# Patient Record
Sex: FEMALE | Race: WHITE | NOT HISPANIC OR LATINO | Employment: OTHER | RURAL
[De-identification: names, ages, dates, MRNs, and addresses within clinical notes are randomized per-mention and may not be internally consistent; named-entity substitution may affect disease eponyms.]

---

## 2022-05-18 ENCOUNTER — OFFICE VISIT (OUTPATIENT)
Dept: FAMILY MEDICINE CLINIC | Facility: CLINIC | Age: 72
End: 2022-05-18

## 2022-05-18 VITALS
WEIGHT: 149.5 LBS | HEIGHT: 67 IN | SYSTOLIC BLOOD PRESSURE: 138 MMHG | DIASTOLIC BLOOD PRESSURE: 88 MMHG | OXYGEN SATURATION: 99 % | BODY MASS INDEX: 23.46 KG/M2 | HEART RATE: 80 BPM

## 2022-05-18 DIAGNOSIS — Z90.710 H/O TOTAL HYSTERECTOMY: ICD-10-CM

## 2022-05-18 DIAGNOSIS — Z71.89 ADVANCED CARE PLANNING/COUNSELING DISCUSSION: ICD-10-CM

## 2022-05-18 DIAGNOSIS — Z13.820 SCREENING FOR OSTEOPOROSIS: ICD-10-CM

## 2022-05-18 DIAGNOSIS — M25.551 RIGHT HIP PAIN: ICD-10-CM

## 2022-05-18 DIAGNOSIS — I10 BENIGN ESSENTIAL HTN: ICD-10-CM

## 2022-05-18 DIAGNOSIS — Z93.2 ILEOSTOMY IN PLACE: ICD-10-CM

## 2022-05-18 DIAGNOSIS — Z00.00 INITIAL MEDICARE ANNUAL WELLNESS VISIT: Primary | ICD-10-CM

## 2022-05-18 DIAGNOSIS — N95.9 UNSPECIFIED MENOPAUSAL AND PERIMENOPAUSAL DISORDER: ICD-10-CM

## 2022-05-18 DIAGNOSIS — Z90.49 H/O COLECTOMY: ICD-10-CM

## 2022-05-18 DIAGNOSIS — E56.9 VITAMIN DEFICIENCY: ICD-10-CM

## 2022-05-18 DIAGNOSIS — E78.2 MIXED HYPERLIPIDEMIA: ICD-10-CM

## 2022-05-18 DIAGNOSIS — Z79.890 HORMONE REPLACEMENT THERAPY: ICD-10-CM

## 2022-05-18 DIAGNOSIS — Z12.31 ENCOUNTER FOR SCREENING MAMMOGRAM FOR MALIGNANT NEOPLASM OF BREAST: ICD-10-CM

## 2022-05-18 DIAGNOSIS — Z12.11 SCREENING FOR COLON CANCER: ICD-10-CM

## 2022-05-18 PROBLEM — Z12.39 SCREENING FOR BREAST CANCER: Status: ACTIVE | Noted: 2022-05-18

## 2022-05-18 PROCEDURE — 1170F FXNL STATUS ASSESSED: CPT | Performed by: NURSE PRACTITIONER

## 2022-05-18 PROCEDURE — 96160 PT-FOCUSED HLTH RISK ASSMT: CPT | Performed by: NURSE PRACTITIONER

## 2022-05-18 PROCEDURE — G0438 PPPS, INITIAL VISIT: HCPCS | Performed by: NURSE PRACTITIONER

## 2022-05-18 PROCEDURE — 99213 OFFICE O/P EST LOW 20 MIN: CPT | Performed by: NURSE PRACTITIONER

## 2022-05-18 PROCEDURE — 1159F MED LIST DOCD IN RCRD: CPT | Performed by: NURSE PRACTITIONER

## 2022-05-18 RX ORDER — ESTRADIOL 1 MG/1
TABLET ORAL
COMMUNITY
Start: 2022-05-17

## 2022-05-18 NOTE — ASSESSMENT & PLAN NOTE
She denies Pap smears stating total hysterectomy.  She understands the risks of hormone replacement therapy.

## 2022-05-18 NOTE — ASSESSMENT & PLAN NOTE
Had lab work completed earlier in the year and denies having repeat blood work today.  We will schedule mammogram.  We will schedule DEXA scan.  We will schedule with GI.  We discussed all immunizations and patient denies all immunizations.  Education provided.  Does not need help with ADLs.  No cognitive impairment.  No hearing impairment.  Overall patient is doing well.  We discussed advanced directives and she states she and her  have information at home on that as she denied further information.  Return to clinic or ED with any issues or concerns.

## 2022-05-18 NOTE — PROGRESS NOTES
The ABCs of the Annual Wellness Visit  Initial Medicare Wellness Visit    Chief Complaint   Patient presents with   • Breast Problem     Subjective   History of Present Illness:  Gloria Bruner is a 72 y.o. female who presents for an Initial Medicare Wellness Visit.    The following portions of the patient's history were reviewed and   updated as appropriate: allergies, current medications, past family history, past medical history, past social history, past surgical history and problem list.     Compared to one year ago, the patient feels her physical   health is the same.    Compared to one year ago, the patient feels her mental   health is the same.    Patient states last week she was standing on the bed and fell off and landed on her right hip.  She is bruised on her right lateral hip.  She is still sore to touch.  No open skin.  Full range of motion of the hip.    States that total hysterectomy 48 years ago and is since then been on estrogen replacement.  She understands the risk of this medication.  Last mammogram was 2013 so she is due a repeat we will get that scheduled.  States no breast issues it is just been a while since she had a mammogram.    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    She denies immunizations such as pneumonia shingles COVID and understands risk of not doing them.  Needs a new referral to GI as she states her past gastroenterologist retired and she has not seen them in over a year.  Last DEXA scan was 2013 so was requesting to have one scheduled.    Doing well today with no major concerns.  No dizziness no headache no chest pain no chest pressure no shortness of breath no trouble breathing no urinary or bowel issues.    Recent Hospitalizations:  She was not admitted to the hospital during the last year.       Current Medical Providers:  Patient Care Team:  Juan C Pérez MD as PCP - General (Family  "Medicine)    Outpatient Medications Prior to Visit   Medication Sig Dispense Refill   • estradiol (ESTRACE) 1 MG tablet        No facility-administered medications prior to visit.       No opioid medication identified on active medication list. I have reviewed chart for other potential  high risk medication/s and harmful drug interactions in the elderly.          Aspirin is not on active medication list.  Aspirin use is not indicated based on review of current medical condition/s. Risk of harm outweighs potential benefits.  .    Patient Active Problem List   Diagnosis   • Initial Medicare annual wellness visit   • Right hip pain   • Hormone replacement therapy   • H/O total hysterectomy   • Benign essential HTN   • Mixed hyperlipidemia   • Ileostomy in place (HCC)   • H/O colectomy   • Advanced care planning/counseling discussion   • Screening for breast cancer   • Screening for colon cancer   • Screening for osteoporosis   • Vitamin deficiency     Advance Care Planning  Advance Directive is not on file.  ACP discussion was held with the patient during this visit. Patient does not have an advance directive, declines further assistance.    Review of Systems   Constitutional: Negative for chills, fatigue and fever.   HENT: Negative for congestion.    Eyes: Negative for discharge.   Respiratory: Negative.    Cardiovascular: Negative.    Gastrointestinal: Negative.    Genitourinary: Negative for dysuria, flank pain and hematuria.   Musculoskeletal: Negative for arthralgias and back pain.   Neurological: Negative for dizziness and numbness.   Psychiatric/Behavioral: Negative.         Objective       Vitals:    05/18/22 1018   BP: 138/88   Pulse: 80   SpO2: 99%   Weight: 67.8 kg (149 lb 8 oz)   Height: 170.2 cm (67\")       Does the patient have evidence of cognitive impairment? No    Physical Exam  Constitutional:       Appearance: Normal appearance.   Eyes:      Extraocular Movements: Extraocular movements intact.      " Conjunctiva/sclera: Conjunctivae normal.      Pupils: Pupils are equal, round, and reactive to light.   Cardiovascular:      Rate and Rhythm: Normal rate and regular rhythm.      Heart sounds: Normal heart sounds.   Pulmonary:      Effort: Pulmonary effort is normal.      Breath sounds: Normal breath sounds.   Musculoskeletal:         General: Normal range of motion.   Skin:         Neurological:      General: No focal deficit present.      Mental Status: She is alert and oriented to person, place, and time. Mental status is at baseline.   Psychiatric:         Mood and Affect: Mood normal.         Behavior: Behavior normal.         Thought Content: Thought content normal.         Judgment: Judgment normal.               HEALTH RISK ASSESSMENT    Smoking Status:  Social History     Tobacco Use   Smoking Status Never Smoker   Smokeless Tobacco Never Used     Alcohol Consumption:  Social History     Substance and Sexual Activity   Alcohol Use None     Fall Risk Screen:    STEADI Fall Risk Assessment was completed, and patient is at LOW risk for falls.Assessment completed on:5/18/2022    Depression Screen:   PHQ-2/PHQ-9 Depression Screening 5/18/2022   Little Interest or Pleasure in Doing Things 0-->not at all   Feeling Down, Depressed or Hopeless 0-->not at all   PHQ-9: Brief Depression Severity Measure Score 0       Health Habits and Functional and Cognitive Screening:  Functional & Cognitive Status 5/18/2022   Do you have difficulty preparing food and eating? No   Do you have difficulty bathing yourself, getting dressed or grooming yourself? No   Do you have difficulty using the toilet? No   Do you have difficulty moving around from place to place? No   Do you have trouble with steps or getting out of a bed or a chair? No   Current Diet Well Balanced Diet   Dental Exam Up to date   Eye Exam Up to date   Exercise (times per week) 8 times per week   Current Exercises Include Walking   Do you need help using the phone?   No   Are you deaf or do you have serious difficulty hearing?  No   Do you need help with transportation? No   Do you need help shopping? No   Do you need help preparing meals?  No   Do you need help with housework?  No   Do you need help with laundry? No   Do you need help taking your medications? No   Do you need help managing money? No   Do you ever drive or ride in a car without wearing a seat belt? No   Have you felt unusual stress, anger or loneliness in the last month? No   Who do you live with? Spouse   If you need help, do you have trouble finding someone available to you? No   Have you been bothered in the last four weeks by sexual problems? No   Do you have difficulty concentrating, remembering or making decisions? No       Age-appropriate Screening Schedule:  Refer to the list below for future screening recommendations based on patient's age, sex and/or medical conditions. Orders for these recommended tests are listed in the plan section. The patient has been provided with a written plan.    Health Maintenance   Topic Date Due   • LIPID PANEL  Never done   • DXA SCAN  05/18/2022 (Originally 5/22/2015)   • ZOSTER VACCINE (1 of 2) 05/18/2022 (Originally 4/10/2002)   • MAMMOGRAM  06/01/2022 (Originally 5/22/2015)   • TDAP/TD VACCINES (1 - Tdap) 05/18/2023 (Originally 4/10/1969)   • INFLUENZA VACCINE  08/01/2022            Assessment & Plan   CMS Preventative Services Quick Reference  Risk Factors Identified During Encounter  Immunizations Discussed/Encouraged (specific Immunizations; Td, Tdap, Hepatitis A Vaccine/Series, Hepatitis B Vaccine/Series, Influenza, Pneumococcal 23, Prevnar 20 (Pneumococcal 20-valent conjugate), Vaxneuvance (Pneumococcal 15-valent conjugate), Shingrix and COVID19  Inactivity/Sedentary  The above risks/problems have been discussed with the patient.  Follow up actions/plans if indicated are seen below in the Assessment/Plan Section.  Pertinent information has been shared with the  patient in the After Visit Summary.    Diagnoses and all orders for this visit:    1. Initial Medicare annual wellness visit (Primary)  Assessment & Plan:  Had lab work completed earlier in the year and denies having repeat blood work today.  We will schedule mammogram.  We will schedule DEXA scan.  We will schedule with GI.  We discussed all immunizations and patient denies all immunizations.  Education provided.  Does not need help with ADLs.  No cognitive impairment.  No hearing impairment.  Overall patient is doing well.  We discussed advanced directives and she states she and her  have information at home on that as she denied further information.  Return to clinic or ED with any issues or concerns.      2. Right hip pain  Assessment & Plan:  X-ray completed, will let know if radiology sees anything.  Rest and ice in 15-minute intervals encouraged.  Return in a week if not improving, sooner if worsens.  Return to clinic or ED with any issues or concerns.    Orders:  -     XR Hip With or Without Pelvis 2 - 3 View Right; Future    3. Screening for osteoporosis  Assessment & Plan:  We will schedule DEXA scan.      4. Encounter for screening mammogram for malignant neoplasm of breast  -     Mammo Screening Bilateral With CAD; Future    5. H/O total hysterectomy  Assessment & Plan:  She denies Pap smears stating total hysterectomy.  She understands the risks of hormone replacement therapy.    Orders:  -     DEXA Bone Density Axial    6. Screening for colon cancer    7. H/O colectomy  -     Ambulatory Referral to Gastroenterology    8. Ileostomy in place (HCC)  Assessment & Plan:  Will refer to a new GI doctor.    Orders:  -     Ambulatory Referral to Gastroenterology    9. Vitamin deficiency    10. Hormone replacement therapy    11. Mixed hyperlipidemia    12. Benign essential HTN    13. Advanced care planning/counseling discussion    14. Unspecified menopausal and perimenopausal disorder   -     DEXA Bone  Density Axial      Follow Up:  Return in about 3 months (around 8/18/2022).     An After Visit Summary and PPPS were made available to the patient.        I spent 45 minutes caring for Gloria on this date of service. This time includes time spent by me in the following activities:preparing for the visit, reviewing tests, obtaining and/or reviewing a separately obtained history, performing a medically appropriate examination and/or evaluation , counseling and educating the patient/family/caregiver, ordering medications, tests, or procedures, documenting information in the medical record and independently interpreting results and communicating that information with the patient/family/caregiver

## 2022-05-18 NOTE — ASSESSMENT & PLAN NOTE
X-ray completed, will let know if radiology sees anything.  Rest and ice in 15-minute intervals encouraged.  Return in a week if not improving, sooner if worsens.  Return to clinic or ED with any issues or concerns.

## 2022-05-19 ENCOUNTER — TELEPHONE (OUTPATIENT)
Dept: FAMILY MEDICINE CLINIC | Facility: CLINIC | Age: 72
End: 2022-05-19

## 2022-06-06 ENCOUNTER — APPOINTMENT (OUTPATIENT)
Dept: WOMENS IMAGING | Facility: HOSPITAL | Age: 72
End: 2022-06-06

## 2022-06-06 PROCEDURE — 77067 SCR MAMMO BI INCL CAD: CPT | Performed by: RADIOLOGY

## 2022-06-09 ENCOUNTER — TELEPHONE (OUTPATIENT)
Dept: FAMILY MEDICINE CLINIC | Facility: CLINIC | Age: 72
End: 2022-06-09

## 2022-06-20 ENCOUNTER — TELEPHONE (OUTPATIENT)
Dept: FAMILY MEDICINE CLINIC | Facility: CLINIC | Age: 72
End: 2022-06-20

## 2022-06-20 NOTE — TELEPHONE ENCOUNTER
Patient is requesting an ultrasound of the right breast. She said she has always gotten an utrasound with her mammograms due to having dense tissue in the breast. She said she is still having discomfort in the right breast

## 2022-06-20 NOTE — TELEPHONE ENCOUNTER
----- Message from JE Ulloa sent at 6/18/2022  8:55 AM EDT -----  Please let patient know that mammogram showed stable masses in both breast that are benign negative.  Screening mammogram in 1 year is recommended.  Thanks

## 2022-06-22 NOTE — TELEPHONE ENCOUNTER
She said she fell a couple weeks ago. I asked if she was checked for rib fracture and she said yes. I told her we did not check her as far as xray is concerned for ribs, we only did her hip. She thinks it has something to do with her dense breast tissue so she wants an utrasound... it did not make sense. Does she need to be seen again?   [Dear  ___] : Dear  [unfilled], [Consult Letter:] : I had the pleasure of evaluating your patient, [unfilled]. [Please see my note below.] : Please see my note below. [Consult Closing:] : Thank you very much for allowing me to participate in the care of this patient.  If you have any questions, please do not hesitate to contact me. [FreeTextEntry2] : Aram Lim MD\par [facility]\par [address]\[place] [address] [Sincerely,] : Sincerely, [FreeTextEntry3] : Christen Buchanan MD \par Breast Surgical Oncologist\par [facility]\par [facility]\par [facility]\par

## 2022-06-22 NOTE — TELEPHONE ENCOUNTER
Tried contacting patient to schedule an appt. Left voicemail message. Left message at [phone].  Unable to leave voicemail message at mobile number, voice mailbox full.

## 2022-06-30 ENCOUNTER — OFFICE VISIT (OUTPATIENT)
Dept: FAMILY MEDICINE CLINIC | Facility: CLINIC | Age: 72
End: 2022-06-30

## 2022-06-30 VITALS
DIASTOLIC BLOOD PRESSURE: 88 MMHG | WEIGHT: 146.7 LBS | SYSTOLIC BLOOD PRESSURE: 126 MMHG | BODY MASS INDEX: 23.02 KG/M2 | HEART RATE: 86 BPM | HEIGHT: 67 IN | OXYGEN SATURATION: 98 %

## 2022-06-30 DIAGNOSIS — N64.4 BREAST PAIN, RIGHT: Primary | ICD-10-CM

## 2022-06-30 PROCEDURE — 99213 OFFICE O/P EST LOW 20 MIN: CPT | Performed by: NURSE PRACTITIONER

## 2022-06-30 NOTE — PROGRESS NOTES
"Chief Complaint  mammogram    Subjective          Gloria Bruner presents to [facility]  History of Present Illness     States for the past month or so she has continued to have right lateral breast tenderness.  She is on estradiol and is wondering if it may just be due to that.  She recently had a screening mammogram that was unremarkable.  States in the past due to her breast density she has had to have ultrasounds.  No redness no warmth no swelling no lumps that she can tell.  No nipple discharge.  No fever no chills no body aches states she feels fine.    Objective   Vital Signs:   /88   Pulse 86   Ht 170.2 cm (67\")   Wt 66.5 kg (146 lb 11.2 oz)   SpO2 98%   BMI 22.98 kg/m²     Body mass index is 22.98 kg/m².    Review of Systems   Constitutional: Negative for chills, fatigue and fever.   Respiratory: Negative for cough, shortness of breath and wheezing.    Cardiovascular: Negative for chest pain.   Gastrointestinal: Negative for abdominal distention and abdominal pain.   Genitourinary: Negative for breast discharge, breast lump, decreased urine volume, dysuria, frequency and urgency.   Musculoskeletal: Negative for back pain.   Skin: Negative for rash and wound.   Neurological: Negative for headache.       Past History:  Medical History: has a past medical history of Diverticulitis.   Surgical History: has a past surgical history that includes COLON RESECTION WITH ILEOSTOMY (1974) and Hysterectomy (1976).   Family History: family history includes Stroke in her brother.   Social History: reports that she has never smoked. She has never used smokeless tobacco.    PHQ-2 Depression Screening  Little interest or pleasure in doing things?     Feeling down, depressed, or hopeless?     PHQ-2 Total Score          PHQ-9 Depression Screening  Little interest or pleasure in doing things?     Feeling down, depressed, or hopeless?     Trouble falling or staying asleep, or sleeping too " much?     Feeling tired or having little energy?     Poor appetite or overeating?     Feeling bad about yourself - or that you are a failure or have let yourself or your family down?     Trouble concentrating on things, such as reading the newspaper or watching television?     Moving or speaking so slowly that other people could have noticed? Or the opposite - being so fidgety or restless that you have been moving around a lot more than usual?     Thoughts that you would be better off dead, or of hurting yourself in some way?     PHQ-9 Total Score     If you checked off any problems, how difficult have these problems made it for you to do your work, take care of things at home, or get along with other people?       PHQ-9 Total Score:                 Current Outpatient Medications:   •  estradiol (ESTRACE) 1 MG tablet, , Disp: , Rfl:    (Not in a hospital admission)     Allergies: Patient has no known allergies.    Physical Exam  Constitutional:       Appearance: Normal appearance.   Cardiovascular:      Rate and Rhythm: Normal rate and regular rhythm.      Heart sounds: Normal heart sounds.   Pulmonary:      Effort: Pulmonary effort is normal.      Breath sounds: Normal breath sounds.   Skin:     Comments: Breast visually normal. No lump to palpation. No nipple discharge. Slightly tender to right lateral side breast to palpation. No redness, no warmth, no swelling, no lesions.     Catrachita AVENDAÑO Present in room during examination.    Neurological:      General: No focal deficit present.      Mental Status: She is alert and oriented to person, place, and time. Mental status is at baseline.   Psychiatric:         Mood and Affect: Mood normal.         Behavior: Behavior normal.         Thought Content: Thought content normal.         Judgment: Judgment normal.          Result Review :                   Assessment and Plan    Diagnoses and all orders for this visit:    1. Breast pain, right (Primary)  Assessment & Plan:  No  abnormality on physical exam.  Will order diagnostic mammogram and ultrasound.  Informed her possibly could be due to her hormone use and she will discuss further with her PCP Dr. Pérez.  We will schedule further imaging.  Return to clinic or ED with any issues or concerns.    Orders:  -     Mammo Diagnostic Right With CAD; Future  -     US Breast Right Complete; Future            BMI is within normal parameters. No other follow-up for BMI required.       Follow Up   Return if symptoms worsen or fail to improve.  Patient was given instructions and counseling regarding her condition or for health maintenance advice. Please see specific information pulled into the AVS if appropriate.     JE Ulloa

## 2022-06-30 NOTE — ASSESSMENT & PLAN NOTE
No abnormality on physical exam.  Will order diagnostic mammogram and ultrasound.  Informed her possibly could be due to her hormone use and she will discuss further with her PCP Dr. Pérez.  We will schedule further imaging.  Return to clinic or ED with any issues or concerns.

## 2022-09-20 ENCOUNTER — TELEPHONE (OUTPATIENT)
Dept: FAMILY MEDICINE CLINIC | Facility: CLINIC | Age: 72
End: 2022-09-20

## 2022-10-05 ENCOUNTER — TELEPHONE (OUTPATIENT)
Dept: FAMILY MEDICINE CLINIC | Facility: CLINIC | Age: 72
End: 2022-10-05

## 2022-10-31 ENCOUNTER — OFFICE VISIT (OUTPATIENT)
Dept: FAMILY MEDICINE CLINIC | Facility: CLINIC | Age: 72
End: 2022-10-31

## 2022-10-31 DIAGNOSIS — R05.1 ACUTE COUGH: ICD-10-CM

## 2022-10-31 DIAGNOSIS — U07.1 COVID-19 VIRUS DETECTED: Primary | ICD-10-CM

## 2022-10-31 LAB
EXPIRATION DATE: ABNORMAL
FLUAV AG UPPER RESP QL IA.RAPID: NOT DETECTED
FLUBV AG UPPER RESP QL IA.RAPID: NOT DETECTED
INTERNAL CONTROL: ABNORMAL
Lab: ABNORMAL
SARS-COV-2 AG UPPER RESP QL IA.RAPID: DETECTED

## 2022-10-31 PROCEDURE — 99213 OFFICE O/P EST LOW 20 MIN: CPT | Performed by: STUDENT IN AN ORGANIZED HEALTH CARE EDUCATION/TRAINING PROGRAM

## 2022-10-31 PROCEDURE — 87428 SARSCOV & INF VIR A&B AG IA: CPT | Performed by: STUDENT IN AN ORGANIZED HEALTH CARE EDUCATION/TRAINING PROGRAM

## 2022-10-31 RX ORDER — GUAIFENESIN AND CODEINE PHOSPHATE 100; 10 MG/5ML; MG/5ML
5 SOLUTION ORAL 3 TIMES DAILY PRN
Qty: 120 ML | Refills: 0 | Status: SHIPPED | OUTPATIENT
Start: 2022-10-31 | End: 2023-02-10

## 2022-10-31 NOTE — PROGRESS NOTES
Chief Complaint  No chief complaint on file.    Ximena Bruner presents to [facility]  URI   This is a new problem. The current episode started in the past 7 days (5 days ago). The problem has been unchanged. There has been no fever. Associated symptoms include congestion, coughing, headaches and sinus pain. Pertinent negatives include no sore throat.       Objective   Vital Signs:   There were no vitals taken for this visit.    There is no height or weight on file to calculate BMI.    Review of Systems   HENT: Positive for congestion. Negative for sore throat.    Respiratory: Positive for cough.        Past History:  Medical History: has a past medical history of Diverticulitis.   Surgical History: has a past surgical history that includes COLON RESECTION WITH ILEOSTOMY (1974) and Hysterectomy (1976).   Family History: family history includes Stroke in her brother.   Social History: reports that she has never smoked. She has never used smokeless tobacco.      Current Outpatient Medications:   •  estradiol (ESTRACE) 1 MG tablet, , Disp: , Rfl:   •  guaiFENesin-codeine (GUAIFENESIN AC) 100-10 MG/5ML liquid, Take 5 mL by mouth 3 (Three) Times a Day As Needed for Cough., Disp: 120 mL, Rfl: 0    Allergies: Patient has no known allergies.    Physical Exam  Constitutional:       General: She is not in acute distress.     Appearance: She is not ill-appearing or toxic-appearing.   HENT:      Head: Normocephalic and atraumatic.      Right Ear: Ear canal and external ear normal.      Left Ear: Ear canal and external ear normal.      Nose: Congestion and rhinorrhea present.      Mouth/Throat:      Pharynx: Posterior oropharyngeal erythema (cobblestoning) present.   Eyes:      General: No scleral icterus.  Cardiovascular:      Rate and Rhythm: Normal rate and regular rhythm.   Pulmonary:      Effort: Pulmonary effort is normal.      Breath sounds: Normal breath sounds.    Neurological:      Mental Status: She is alert.          Result Review :                   Assessment and Plan    Diagnoses and all orders for this visit:    1. Acute cough (Primary)  -     POCT SARS-CoV-2 Antigen SCOTT + Flu  -     guaiFENesin-codeine (GUAIFENESIN AC) 100-10 MG/5ML liquid; Take 5 mL by mouth 3 (Three) Times a Day As Needed for Cough.  Dispense: 120 mL; Refill: 0    COVID 19 positive. Will send guaitussin for cough. Tylenol/Motrin for pain/fever. OTC cough and cold medication for symptoms. Nasal saline spray recommended. Cool mist humidifier at the bedside. RTC with new or worsening symptoms.      Follow Up   No follow-ups on file.  Patient was given instructions and counseling regarding her condition or for health maintenance advice. Please see specific information pulled into the AVS if appropriate.     Gwen Sood, DO

## 2023-02-10 ENCOUNTER — OFFICE VISIT (OUTPATIENT)
Dept: FAMILY MEDICINE CLINIC | Facility: CLINIC | Age: 73
End: 2023-02-10
Payer: MEDICARE

## 2023-02-10 VITALS
SYSTOLIC BLOOD PRESSURE: 190 MMHG | DIASTOLIC BLOOD PRESSURE: 96 MMHG | RESPIRATION RATE: 18 BRPM | HEIGHT: 67 IN | HEART RATE: 97 BPM | BODY MASS INDEX: 22.35 KG/M2 | OXYGEN SATURATION: 100 % | WEIGHT: 142.38 LBS

## 2023-02-10 DIAGNOSIS — M79.89 RIGHT LEG SWELLING: Primary | ICD-10-CM

## 2023-02-10 PROCEDURE — 99214 OFFICE O/P EST MOD 30 MIN: CPT | Performed by: FAMILY MEDICINE

## 2023-02-10 RX ORDER — PREDNISONE 20 MG/1
40 TABLET ORAL DAILY
Qty: 10 TABLET | Refills: 0 | Status: SHIPPED | OUTPATIENT
Start: 2023-02-10 | End: 2023-02-15

## 2023-02-10 RX ORDER — AMLODIPINE BESYLATE 5 MG/1
5 TABLET ORAL DAILY
Qty: 30 TABLET | Refills: 5 | Status: SHIPPED | OUTPATIENT
Start: 2023-02-10 | End: 2023-02-17

## 2023-02-10 NOTE — PROGRESS NOTES
Follow Up Office Visit      Date of Visit:  02/10/2023   Patient Name: Gloria Bruner  : 1950   MRN: [id number]     Chief Complaint:    Chief Complaint   Patient presents with   • Leg Swelling     Patient states her right leg has been swollen and painful since Monday.   • Leg Pain       History of Present Illness: Gloria Bruner is a 72 y.o. female who is here today for follow up.  Patient comes in today for right lower extremity discomfort.  She also has a great deal of swelling in the extremity below the knee.  Pain is located in the calf.  There is no known injury.        Subjective      Review of Systems:   Review of Systems   Constitutional: Negative for fatigue and fever.   HENT: Negative for congestion and ear pain.    Respiratory: Negative for apnea, cough, chest tightness and shortness of breath.    Cardiovascular: Positive for leg swelling. Negative for chest pain.   Gastrointestinal: Negative for abdominal pain, constipation, diarrhea and nausea.   Musculoskeletal: Negative for arthralgias.   Psychiatric/Behavioral: Negative for depressed mood and stress.       Past Medical History:   Past Medical History:   Diagnosis Date   • Diverticulitis        Past Surgical History:   Past Surgical History:   Procedure Laterality Date   • COLON RESECTION WITH ILEOSTOMY      PERMANENT   • HYSTERECTOMY      complete       Family History:   Family History   Problem Relation Age of Onset   • Stroke Brother        Social History:   Social History     Socioeconomic History   • Marital status:    Tobacco Use   • Smoking status: Never   • Smokeless tobacco: Never   Vaping Use   • Vaping Use: Never used       Medications:     Current Outpatient Medications:   •  amLODIPine (NORVASC) 5 MG tablet, Take 1 tablet by mouth Daily., Disp: 30 tablet, Rfl: 5  •  estradiol (ESTRACE) 1 MG tablet, , Disp: , Rfl:   •  predniSONE (DELTASONE) 20 MG tablet, Take 2 tablets by mouth Daily for 5 days., Disp: 10 tablet,  "Rfl: 0    Allergies:   No Known Allergies    Objective     Physical Exam:  Vital Signs:   Vitals:    02/10/23 1100   BP: (!) 190/96   BP Location: Left arm   Patient Position: Sitting   Cuff Size: Adult   Pulse: 97   Resp: 18   SpO2: 100%   Weight: 64.6 kg (142 lb 6 oz)   Height: 170.2 cm (67\")   PainSc:   8     Body mass index is 22.3 kg/m².     Physical Exam  Vitals and nursing note reviewed.   Constitutional:       General: She is not in acute distress.     Appearance: Normal appearance. She is not ill-appearing.   HENT:      Head: Normocephalic and atraumatic.      Right Ear: Tympanic membrane and ear canal normal.      Left Ear: Tympanic membrane and ear canal normal.      Nose: Nose normal.   Cardiovascular:      Rate and Rhythm: Normal rate and regular rhythm.      Heart sounds: Normal heart sounds.   Pulmonary:      Effort: Pulmonary effort is normal.      Breath sounds: Normal breath sounds.   Musculoskeletal:      Comments: Right lower extremity extremely swollen below the knee.  Pain on palpation of her calf.   Neurological:      Mental Status: She is alert and oriented to person, place, and time. Mental status is at baseline.   Psychiatric:         Mood and Affect: Mood normal.         Procedures      Assessment / Plan      Assessment/Plan:   Diagnoses and all orders for this visit:    1. Right leg swelling (Primary)  -     Duplex Lower Extremity Art / Grafts - Right CAR; Future    Other orders  -     Cancel: Duplex Venous Lower Extremity - Left CAR; Future  -     amLODIPine (NORVASC) 5 MG tablet; Take 1 tablet by mouth Daily.  Dispense: 30 tablet; Refill: 5  -     predniSONE (DELTASONE) 20 MG tablet; Take 2 tablets by mouth Daily for 5 days.  Dispense: 10 tablet; Refill: 0         Very concerned about DVT.  Exam very concerning.  No known injury.  Sent to hospital for ultrasound to rule out DVT.  Report was actually back by the time of this dictation.  Had patient come back to the office after the " ultrasound.  It was actually negative for DVT.  Showed possible hematoma versus dissecting popliteal cyst.  She does have some arthritis in the knee.  Both of these would be very inflammatory conditions.  Gave steroids for now.  Use ice and elevation.  No DVT was visualized with her ultrasound.  She will let us know Monday how she is feeling.    Follow Up:   No follow-ups on file.    Juan C Pérez  [facility] [place]

## 2023-02-13 ENCOUNTER — TELEPHONE (OUTPATIENT)
Dept: FAMILY MEDICINE CLINIC | Facility: CLINIC | Age: 73
End: 2023-02-13
Payer: MEDICARE

## 2023-02-13 DIAGNOSIS — M79.89 RIGHT LEG SWELLING: Primary | ICD-10-CM

## 2023-02-13 NOTE — TELEPHONE ENCOUNTER
Lets make referral to ortho.  Not sure what else to do.  Stop the norvasc.  Cont the prednisone for now.   Making referral to ortho

## 2023-02-13 NOTE — TELEPHONE ENCOUNTER
PATIENT ON amLODIPine (NORVASC) 5 MG tablet AND predniSONE (DELTASONE) 20 MG tablet.  SHE IS GETTING DIZZY AND CAN'T WALK ACROSS THE FLOOR.  LEG IS STILL HURTING REALLY BAD.  FELL IN BATHROOM THIS MORNING FROM DIZZY.      PLEASE CALL [phone]

## 2023-02-14 ENCOUNTER — TELEPHONE (OUTPATIENT)
Dept: FAMILY MEDICINE CLINIC | Facility: CLINIC | Age: 73
End: 2023-02-14
Payer: MEDICARE

## 2023-02-14 NOTE — TELEPHONE ENCOUNTER
Caller: Gloria Bruner    Relationship: Self    Best call back number: [phone]    What is the best time to reach you: ANY    Who are you requesting to speak with (clinical staff, provider,  specific staff member): CLINICAL    What was the call regarding: THE PATIENT CALLING TO STATE THAT SHE HAS DEVELOPED A BLUISH PURPLE SPOT ON RIGHT ANKLE WITH SWELLING. SHE STATES THAT HER LEG IS STILL SWOLLEN AND HAS A DULL ACHE ON THE BACK, ESPECIALLY WHEN WALKING.    THE PATIENT STATES THAT SHE IS HAVING TROUBLE TAKING THE PREDNISONE, AS IT IS MAKING HER SICK.      Do you require a callback: YES, PLEASE AS THE PATIENT IS QUITE CONCERNED.    THANK YOU.

## 2023-02-15 NOTE — TELEPHONE ENCOUNTER
The only thing I know to do is take another look at it to see what to do about it.   We will have to see her I guess tomorrow sometime.

## 2023-02-17 ENCOUNTER — HOSPITAL ENCOUNTER (EMERGENCY)
Facility: HOSPITAL | Age: 73
Discharge: HOME | End: 2023-02-17
Attending: EMERGENCY MEDICINE | Admitting: EMERGENCY MEDICINE
Payer: MEDICARE

## 2023-02-17 ENCOUNTER — APPOINTMENT (OUTPATIENT)
Dept: CARDIOLOGY | Facility: HOSPITAL | Age: 73
End: 2023-02-17
Payer: MEDICARE

## 2023-02-17 ENCOUNTER — OFFICE VISIT (OUTPATIENT)
Dept: FAMILY MEDICINE CLINIC | Facility: CLINIC | Age: 73
End: 2023-02-17
Payer: MEDICARE

## 2023-02-17 VITALS
DIASTOLIC BLOOD PRESSURE: 90 MMHG | HEIGHT: 67 IN | HEART RATE: 76 BPM | SYSTOLIC BLOOD PRESSURE: 194 MMHG | OXYGEN SATURATION: 98 % | BODY MASS INDEX: 22.44 KG/M2 | WEIGHT: 143 LBS

## 2023-02-17 VITALS
SYSTOLIC BLOOD PRESSURE: 195 MMHG | WEIGHT: 143 LBS | HEART RATE: 110 BPM | DIASTOLIC BLOOD PRESSURE: 83 MMHG | TEMPERATURE: 98.5 F | RESPIRATION RATE: 20 BRPM | HEIGHT: 67 IN | OXYGEN SATURATION: 96 % | BODY MASS INDEX: 22.44 KG/M2

## 2023-02-17 DIAGNOSIS — I10 UNCONTROLLED HYPERTENSION: ICD-10-CM

## 2023-02-17 DIAGNOSIS — M79.89 RIGHT LEG SWELLING: Primary | ICD-10-CM

## 2023-02-17 DIAGNOSIS — M79.604 PAIN OF RIGHT LOWER EXTREMITY: Primary | ICD-10-CM

## 2023-02-17 DIAGNOSIS — S80.11XA CONTUSION OF RIGHT LOWER LEG, INITIAL ENCOUNTER: ICD-10-CM

## 2023-02-17 LAB
BH CV LOWER VASCULAR LEFT COMMON FEMORAL AUGMENT: NORMAL
BH CV LOWER VASCULAR LEFT COMMON FEMORAL COMPRESS: NORMAL
BH CV LOWER VASCULAR LEFT COMMON FEMORAL PHASIC: NORMAL
BH CV LOWER VASCULAR LEFT COMMON FEMORAL SPONT: NORMAL
BH CV LOWER VASCULAR RIGHT COMMON FEMORAL AUGMENT: NORMAL
BH CV LOWER VASCULAR RIGHT COMMON FEMORAL COMPRESS: NORMAL
BH CV LOWER VASCULAR RIGHT COMMON FEMORAL PHASIC: NORMAL
BH CV LOWER VASCULAR RIGHT COMMON FEMORAL SPONT: NORMAL
BH CV LOWER VASCULAR RIGHT DISTAL FEMORAL COMPRESS: NORMAL
BH CV LOWER VASCULAR RIGHT GASTRONEMIUS COMPRESS: NORMAL
BH CV LOWER VASCULAR RIGHT GREATER SAPH AK COMPRESS: NORMAL
BH CV LOWER VASCULAR RIGHT GREATER SAPH BK COMPRESS: NORMAL
BH CV LOWER VASCULAR RIGHT LESSER SAPH COMPRESS: NORMAL
BH CV LOWER VASCULAR RIGHT MID FEMORAL AUGMENT: NORMAL
BH CV LOWER VASCULAR RIGHT MID FEMORAL COMPRESS: NORMAL
BH CV LOWER VASCULAR RIGHT MID FEMORAL PHASIC: NORMAL
BH CV LOWER VASCULAR RIGHT MID FEMORAL SPONT: NORMAL
BH CV LOWER VASCULAR RIGHT PERONEAL COMPRESS: NORMAL
BH CV LOWER VASCULAR RIGHT POPLITEAL AUGMENT: NORMAL
BH CV LOWER VASCULAR RIGHT POPLITEAL COMPRESS: NORMAL
BH CV LOWER VASCULAR RIGHT POPLITEAL PHASIC: NORMAL
BH CV LOWER VASCULAR RIGHT POPLITEAL SPONT: NORMAL
BH CV LOWER VASCULAR RIGHT POSTERIOR TIBIAL COMPRESS: NORMAL
BH CV LOWER VASCULAR RIGHT PROFUNDA FEMORAL COMPRESS: NORMAL
BH CV LOWER VASCULAR RIGHT PROXIMAL FEMORAL COMPRESS: NORMAL
BH CV LOWER VASCULAR RIGHT SAPHENOFEMORAL JUNCTION COMPRESS: NORMAL
BH CV VAS PRELIMINARY FINDINGS SCRIPTING: 1
MAXIMAL PREDICTED HEART RATE: 150 BPM
STRESS TARGET HR: 128 BPM

## 2023-02-17 PROCEDURE — 93971 EXTREMITY STUDY: CPT | Performed by: INTERNAL MEDICINE

## 2023-02-17 PROCEDURE — 99214 OFFICE O/P EST MOD 30 MIN: CPT | Performed by: FAMILY MEDICINE

## 2023-02-17 PROCEDURE — 99282 EMERGENCY DEPT VISIT SF MDM: CPT

## 2023-02-17 PROCEDURE — 93971 EXTREMITY STUDY: CPT

## 2023-02-17 RX ORDER — LISINOPRIL 20 MG/1
20 TABLET ORAL DAILY
Qty: 30 TABLET | Refills: 5 | Status: SHIPPED | OUTPATIENT
Start: 2023-02-17 | End: 2023-03-22

## 2023-02-17 NOTE — ED PROVIDER NOTES
Subjective   History of Present Illness  Gloria Bruner is a 72 yr old female that presents the emergency department for complaints of right lower extremity pain.  Patient reports that she has had pain in her right leg over the past week.  Patient noticed bruising to her right foot.  1 week ago patient was ruled out for DVT.  Patient saw her PCP today and was sent to our ED to rule out DVT.  Good.  She denies chest pain or shortness of breath.  No history of DVTs.    History provided by:  Patient   used: No    Leg Pain  Location:  Leg and foot  Leg location:  L lower leg  Foot location:  L foot  Pain details:     Quality:  Aching    Severity:  Mild    Duration:  1 week    Timing:  Constant    Progression:  Unchanged  Associated symptoms: swelling    Associated symptoms: no decreased ROM, no numbness and no tingling        Review of Systems   Musculoskeletal:        Lt leg pain, swelling     Neurological: Negative for weakness and numbness.   All other systems reviewed and are negative.      Past Medical History:   Diagnosis Date   • Diverticulitis        No Known Allergies    Past Surgical History:   Procedure Laterality Date   • COLON RESECTION WITH ILEOSTOMY  1974    PERMANENT   • HYSTERECTOMY  1976    complete       Family History   Problem Relation Age of Onset   • Stroke Brother        Social History     Socioeconomic History   • Marital status:    Tobacco Use   • Smoking status: Never   • Smokeless tobacco: Never   Vaping Use   • Vaping Use: Never used           Objective   Physical Exam  Vitals and nursing note reviewed.   Constitutional:       Appearance: Normal appearance. She is well-developed. She is not toxic-appearing.   HENT:      Head: Normocephalic and atraumatic.   Eyes:      General: Lids are normal.      Conjunctiva/sclera: Conjunctivae normal.   Neck:      Trachea: Trachea normal.   Cardiovascular:      Rate and Rhythm: Regular rhythm.      Pulses: Normal pulses.       Heart sounds: Normal heart sounds.   Pulmonary:      Effort: Pulmonary effort is normal. No respiratory distress.      Breath sounds: Normal breath sounds. No decreased breath sounds, wheezing, rhonchi or rales.   Abdominal:      General: Bowel sounds are normal.      Palpations: Abdomen is soft.      Tenderness: There is no abdominal tenderness.   Musculoskeletal:      Cervical back: Full passive range of motion without pain and normal range of motion.      Comments: Lt leg pain, swelling: Plantar aspect of foot has bruising.  Patient has tenderness and swelling to her left foot, left calf.  Pulses are palpable.     Skin:     General: Skin is warm and dry.      Findings: No rash.   Neurological:      Mental Status: She is alert and oriented to person, place, and time.      Cranial Nerves: No cranial nerve deficit.   Psychiatric:         Speech: Speech normal.         Behavior: Behavior normal. Behavior is cooperative.         Procedures           ED Course  ED Course as of 02/17/23 1945 Fri Feb 17, 2023   1600 Patient is a 72-year-old female that presents emergency department for complaints of left lower extremity pain and swelling.  Patient is here to rule out a DVT.  Symptoms started over a week ago.  Patient had a negative duplex 1 week ago.  Patient saw her PCP today and was sent for repeat duplex.    Differential DX:  DVT, Peripheral artery disease, musculoskeletal pain. [KG]   1730 Venous duplex was negative for DVT. [KG]   1730 Results are discussed with the patient at this time.  Patient to follow-up with her PCP.  Patient to elevate her lower extremity.  Patient to return to the ED as needed.  She agrees with the treatment plan and verbalized understanding. [KG]      ED Course User Index  [KG] Valery Hernandez, APRN           Recent Results (from the past 24 hour(s))   Duplex Venous Lower Extremity - Right CAR    Collection Time: 02/17/23  4:31 PM   Result Value Ref Range    Target HR (85%) 128 bpm     "Max. Pred. HR (100%) 150 bpm    Right Common Femoral Spont Y     Right Common Femoral Phasic Y     Right Common Femoral Compress C     Right Common Femoral Augment Y     Right Saphenofemoral Junction Compress C     Right Profunda Femoral Compress C     Right Proximal Femoral Compress C     Right Mid Femoral Spont Y     Right Mid Femoral Phasic Y     Right Mid Femoral Compress C     Right Mid Femoral Augment Y     Right Distal Femoral Compress C     Right Popliteal Spont Y     Right Popliteal Phasic Y     Right Popliteal Compress C     Right Popliteal Augment Y     Right Posterior Tibial Compress C     Right Peroneal Compress C     Right Gastronemius Compress C     Right Greater Saph AK Compress C     Right Greater Saph BK Compress C     Right Lesser Saph Compress C     Left Common Femoral Spont Y     Left Common Femoral Phasic Y     Left Common Femoral Compress C     Left Common Femoral Augment Y     BH CV VAS PRELIMINARY FINDINGS SCRIPTING 1.0      Note: In addition to lab results from this visit, the labs listed above may include labs taken at another facility or during a different encounter within the last 24 hours. Please correlate lab times with ED admission and discharge times for further clarification of the services performed during this visit.    No orders to display     Vitals:    02/17/23 1514   BP: (!) 195/83   BP Location: Left arm   Patient Position: Sitting   Pulse: 110   Resp: 20   Temp: 98.5 °F (36.9 °C)   TempSrc: Oral   SpO2: 96%   Weight: 64.9 kg (143 lb)   Height: 170.2 cm (67\")     Medications - No data to display  ECG/EMG Results (last 24 hours)     ** No results found for the last 24 hours. **        No orders to display                                       MDM    Final diagnoses:   Pain of right lower extremity   Contusion of right lower leg, initial encounter   Uncontrolled hypertension       ED Disposition  ED Disposition     ED Disposition   Discharge    Condition   Stable    Comment "   --             Juan C Pérez MD  [address] [facility] [address]  [phone]               Medication List      No changes were made to your prescriptions during this visit.          Valery Hernandez, APRN  02/17/23 1945

## 2023-02-17 NOTE — DISCHARGE INSTRUCTIONS
No DVT to right lower extremity.    Continue to elevate right lower extremity.  Follow-up with your primary care physician.

## 2023-02-19 NOTE — PROGRESS NOTES
Follow Up Office Visit      Date of Visit:  2023   Patient Name: Gloria Bruner  : 1950   MRN: [id number]     Chief Complaint:    Chief Complaint   Patient presents with   • Leg Pain     Right        History of Present Illness: Gloria Bruner is a 72 y.o. female who is here today for follow up.  Patient comes back today because of continued pain and swelling of the right lower extremity.  Pain has worsened somewhat.  She is also noticed that her foot was colder today.  We had sent her to rule out DVT last week and it was negative.        Subjective      Review of Systems:   Review of Systems   Constitutional: Negative for fatigue and fever.   HENT: Negative for congestion and ear pain.    Respiratory: Negative for apnea, cough, chest tightness and shortness of breath.    Cardiovascular: Positive for leg swelling. Negative for chest pain.   Gastrointestinal: Negative for abdominal pain, constipation, diarrhea and nausea.   Musculoskeletal: Negative for arthralgias.   Psychiatric/Behavioral: Negative for depressed mood and stress.       Past Medical History:   Past Medical History:   Diagnosis Date   • Diverticulitis        Past Surgical History:   Past Surgical History:   Procedure Laterality Date   • COLON RESECTION WITH ILEOSTOMY      PERMANENT   • HYSTERECTOMY      complete       Family History:   Family History   Problem Relation Age of Onset   • Stroke Brother        Social History:   Social History     Socioeconomic History   • Marital status:    Tobacco Use   • Smoking status: Never   • Smokeless tobacco: Never   Vaping Use   • Vaping Use: Never used       Medications:     Current Outpatient Medications:   •  estradiol (ESTRACE) 1 MG tablet, , Disp: , Rfl:   •  lisinopril (PRINIVIL,ZESTRIL) 20 MG tablet, Take 1 tablet by mouth Daily., Disp: 30 tablet, Rfl: 5    Allergies:   No Known Allergies    Objective     Physical Exam:  Vital Signs:   Vitals:    23 1316   BP: (!)  "194/90   Pulse: 76   SpO2: 98%   Weight: 64.9 kg (143 lb)   Height: 170.2 cm (67\")     Body mass index is 22.4 kg/m².     Physical Exam  Vitals and nursing note reviewed.   Constitutional:       General: She is not in acute distress.     Appearance: Normal appearance. She is not ill-appearing.   HENT:      Head: Normocephalic and atraumatic.      Right Ear: Tympanic membrane and ear canal normal.      Left Ear: Tympanic membrane and ear canal normal.      Nose: Nose normal.   Cardiovascular:      Rate and Rhythm: Normal rate and regular rhythm.      Heart sounds: Normal heart sounds.      Comments: I was unable to palpate pulses in the right lower extremity.  Foot actually very cold to touch.  Pulmonary:      Effort: Pulmonary effort is normal.      Breath sounds: Normal breath sounds.   Musculoskeletal:      Right lower leg: Edema present.   Neurological:      Mental Status: She is alert and oriented to person, place, and time. Mental status is at baseline.   Psychiatric:         Mood and Affect: Mood normal.         Procedures      Assessment / Plan      Assessment/Plan:   Diagnoses and all orders for this visit:    1. Right leg swelling (Primary)    Other orders  -     lisinopril (PRINIVIL,ZESTRIL) 20 MG tablet; Take 1 tablet by mouth Daily.  Dispense: 30 tablet; Refill: 5         I went ahead and gave a prescription for lisinopril.  She was not able to tolerate the prior amlodipine.  Mostly concerned about the foot.  Not exactly sure what is going on.  She ruled out for DVT last week.  They mention a popliteal cyst dissection.  She does have some bruising in the foot.  The bigger issue today is I cannot palpate any pulses in the foot and is also very cold.  I am sending her to the emergency room for evaluation for the lack of pulses in the foot.  Discussed with ER.  Called myself.    Follow Up:   No follow-ups on file.    Juan C Pérez  [facility] [place] [facility] [place]   "

## 2023-03-22 ENCOUNTER — OFFICE VISIT (OUTPATIENT)
Dept: FAMILY MEDICINE CLINIC | Facility: CLINIC | Age: 73
End: 2023-03-22
Payer: MEDICARE

## 2023-03-22 VITALS
BODY MASS INDEX: 22.6 KG/M2 | SYSTOLIC BLOOD PRESSURE: 168 MMHG | WEIGHT: 144 LBS | HEART RATE: 82 BPM | DIASTOLIC BLOOD PRESSURE: 80 MMHG | OXYGEN SATURATION: 96 % | HEIGHT: 67 IN

## 2023-03-22 DIAGNOSIS — J02.9 SORE THROAT: Primary | ICD-10-CM

## 2023-03-22 LAB
EXPIRATION DATE: NORMAL
EXPIRATION DATE: NORMAL
FLUAV AG UPPER RESP QL IA.RAPID: NOT DETECTED
FLUBV AG UPPER RESP QL IA.RAPID: NOT DETECTED
INTERNAL CONTROL: NORMAL
INTERNAL CONTROL: NORMAL
Lab: NORMAL
Lab: NORMAL
S PYO AG THROAT QL: NEGATIVE
SARS-COV-2 AG UPPER RESP QL IA.RAPID: NOT DETECTED

## 2023-03-22 PROCEDURE — 3079F DIAST BP 80-89 MM HG: CPT | Performed by: PHYSICIAN ASSISTANT

## 2023-03-22 PROCEDURE — 87880 STREP A ASSAY W/OPTIC: CPT | Performed by: PHYSICIAN ASSISTANT

## 2023-03-22 PROCEDURE — 87428 SARSCOV & INF VIR A&B AG IA: CPT | Performed by: PHYSICIAN ASSISTANT

## 2023-03-22 PROCEDURE — 99213 OFFICE O/P EST LOW 20 MIN: CPT | Performed by: PHYSICIAN ASSISTANT

## 2023-03-22 PROCEDURE — 3077F SYST BP >= 140 MM HG: CPT | Performed by: PHYSICIAN ASSISTANT

## 2023-03-23 NOTE — PROGRESS NOTES
"Chief Complaint  Earache (Ear ache, sore throat and headache started Sunday night )    Subjective          Gloria Bruner presents to [facility]  History of Present Illness  Patient in today for evaluation on sore throat that started 3 days ago with some mild ear pressure and headache off/on. Denies any fever. No vomiting or diarrhea. Denies any known sick contacts.   Sore Throat   This is a new problem. The current episode started in the past 7 days. There has been no fever. Associated symptoms include ear pain and headaches. Pertinent negatives include no abdominal pain, congestion, coughing, diarrhea, ear discharge, shortness of breath or vomiting.       Objective   Vital Signs:   /80 (BP Location: Left arm, Patient Position: Sitting, Cuff Size: Adult)   Pulse 82   Ht 170.2 cm (67\")   Wt 65.3 kg (144 lb)   SpO2 96%   BMI 22.55 kg/m²     Body mass index is 22.55 kg/m².    Review of Systems   Constitutional: Negative for fever.   HENT: Positive for ear pain and sore throat. Negative for congestion and ear discharge.    Respiratory: Negative for cough and shortness of breath.    Cardiovascular: Negative for chest pain.   Gastrointestinal: Negative for abdominal pain, diarrhea, nausea and vomiting.   Neurological: Positive for headache. Negative for dizziness.       Past History:  Medical History: has a past medical history of Diverticulitis.   Surgical History: has a past surgical history that includes COLON RESECTION WITH ILEOSTOMY (1974) and Hysterectomy (1976).   Family History: family history includes Stroke in her brother.   Social History: reports that she has never smoked. She has never used smokeless tobacco. She reports that she does not drink alcohol and does not use drugs.      Current Outpatient Medications:   •  estradiol (ESTRACE) 1 MG tablet, , Disp: , Rfl:   Allergies: Patient has no known allergies.    Physical Exam  Constitutional:       Appearance: Normal " appearance.   HENT:      Right Ear: Tympanic membrane normal.      Left Ear: Tympanic membrane normal.      Mouth/Throat:      Pharynx: Oropharynx is clear.   Eyes:      Conjunctiva/sclera: Conjunctivae normal.      Pupils: Pupils are equal, round, and reactive to light.   Cardiovascular:      Rate and Rhythm: Normal rate and regular rhythm.      Heart sounds: Normal heart sounds.   Pulmonary:      Effort: Pulmonary effort is normal.      Breath sounds: Normal breath sounds.   Abdominal:      Tenderness: There is no abdominal tenderness.   Neurological:      Mental Status: She is oriented to person, place, and time.   Psychiatric:         Mood and Affect: Mood normal.         Behavior: Behavior normal.             Assessment and Plan   Diagnoses and all orders for this visit:    1. Sore throat (Primary)  -     Covid-19 + Flu A&B AG, Veritor  -     POC Rapid Strep A  -     Throat / Upper Respiratory Culture - Swab, Throat; Future  -     Throat / Upper Respiratory Culture - Swab, Throat  Rapid strep, covid and flu testing negative. Will send throat culture. Recommend symptom management along with good hydration and rest. RTC if not improving. Discussed  BP up today- recommend patient to monitor bp and rtc if not improving.           Follow Up   No follow-ups on file.  Patient was given instructions and counseling regarding her condition or for health maintenance advice. Please see specific information pulled into the AVS if appropriate.     Jacqueline Montemayor PA-C

## 2023-03-24 LAB
BACTERIA SPEC RESP CULT: NORMAL
BACTERIA SPEC RESP CULT: NORMAL

## 2023-05-02 ENCOUNTER — OFFICE VISIT (OUTPATIENT)
Dept: FAMILY MEDICINE CLINIC | Facility: CLINIC | Age: 73
End: 2023-05-02
Payer: MEDICARE

## 2023-05-02 VITALS
HEART RATE: 72 BPM | HEIGHT: 67 IN | DIASTOLIC BLOOD PRESSURE: 94 MMHG | WEIGHT: 146 LBS | BODY MASS INDEX: 22.91 KG/M2 | OXYGEN SATURATION: 97 % | SYSTOLIC BLOOD PRESSURE: 160 MMHG

## 2023-05-02 DIAGNOSIS — M79.89 RIGHT LEG SWELLING: Primary | ICD-10-CM

## 2023-05-02 DIAGNOSIS — I10 ESSENTIAL HYPERTENSION: ICD-10-CM

## 2023-05-02 PROCEDURE — 3077F SYST BP >= 140 MM HG: CPT | Performed by: FAMILY MEDICINE

## 2023-05-02 PROCEDURE — 99214 OFFICE O/P EST MOD 30 MIN: CPT | Performed by: FAMILY MEDICINE

## 2023-05-02 PROCEDURE — 3080F DIAST BP >= 90 MM HG: CPT | Performed by: FAMILY MEDICINE

## 2023-05-02 NOTE — PROGRESS NOTES
Follow Up Office Visit      Date of Visit:  2023   Patient Name: Gloria Bruner  : 1950   MRN: [id number]     Chief Complaint:    Chief Complaint   Patient presents with   • Hypertension       History of Present Illness: Gloria Bruner is a 73 y.o. female who is here today for follow up.  Patient seen for follow-up on hypertension.  Blood pressure still not controlled.  Does not tolerate the amlodipine well.  Intolerant to a lot of different medications.  We also discussed her right lower leg again.  There was still some edema.  Ruled out for any kind of blockages or either arterial or venous.  Suspected muscle tear or strain.  Still with some swelling.  This is causing some itching and burning.        Subjective      Review of Systems:   Review of Systems   Constitutional: Negative for fatigue and fever.   HENT: Negative for congestion and ear pain.    Respiratory: Negative for apnea, cough, chest tightness and shortness of breath.    Cardiovascular: Positive for leg swelling. Negative for chest pain.   Gastrointestinal: Negative for abdominal pain, constipation, diarrhea and nausea.   Musculoskeletal: Negative for arthralgias.   Psychiatric/Behavioral: Negative for depressed mood and stress.       Past Medical History:   Past Medical History:   Diagnosis Date   • Diverticulitis        Past Surgical History:   Past Surgical History:   Procedure Laterality Date   • COLON RESECTION WITH ILEOSTOMY  1974    PERMANENT   • HYSTERECTOMY      complete       Family History:   Family History   Problem Relation Age of Onset   • Stroke Brother        Social History:   Social History     Socioeconomic History   • Marital status:    Tobacco Use   • Smoking status: Never   • Smokeless tobacco: Never   Vaping Use   • Vaping Use: Never used   Substance and Sexual Activity   • Alcohol use: Never   • Drug use: Never   • Sexual activity: Defer       Medications:     Current Outpatient Medications:   •   "estradiol (ESTRACE) 1 MG tablet, , Disp: , Rfl:   •  metoprolol tartrate (LOPRESSOR) 25 MG tablet, Take 1 tablet by mouth 2 (Two) Times a Day., Disp: 60 tablet, Rfl: 5    Allergies:   No Known Allergies    Objective     Physical Exam:  Vital Signs:   Vitals:    05/02/23 0847   BP: 160/94   Pulse: 72   SpO2: 97%   Weight: 66.2 kg (146 lb)   Height: 170.2 cm (67\")     Body mass index is 22.87 kg/m².     Physical Exam  Vitals and nursing note reviewed.   Constitutional:       General: She is not in acute distress.     Appearance: Normal appearance. She is not ill-appearing.   HENT:      Head: Normocephalic and atraumatic.      Right Ear: Tympanic membrane and ear canal normal.      Left Ear: Tympanic membrane and ear canal normal.      Nose: Nose normal.   Cardiovascular:      Rate and Rhythm: Normal rate and regular rhythm.      Heart sounds: Normal heart sounds.   Pulmonary:      Effort: Pulmonary effort is normal.      Breath sounds: Normal breath sounds.   Musculoskeletal:      Right lower leg: Edema present.   Neurological:      Mental Status: She is alert and oriented to person, place, and time. Mental status is at baseline.   Psychiatric:         Mood and Affect: Mood normal.         Procedures      Assessment / Plan      Assessment/Plan:   Diagnoses and all orders for this visit:    1. Right leg swelling (Primary)    2. Essential hypertension    Other orders  -     metoprolol tartrate (LOPRESSOR) 25 MG tablet; Take 1 tablet by mouth 2 (Two) Times a Day.  Dispense: 60 tablet; Refill: 5         Start metoprolol.  Stop amlodipine.  Side effects.  She needs to continue working with stretching her leg with the muscle strain.  Also wear support stockings.    Follow Up:   No follow-ups on file.    Juan C [facility] [place]   "

## 2023-06-15 ENCOUNTER — OFFICE VISIT (OUTPATIENT)
Dept: FAMILY MEDICINE CLINIC | Facility: CLINIC | Age: 73
End: 2023-06-15
Payer: MEDICARE

## 2023-06-15 VITALS
HEART RATE: 74 BPM | WEIGHT: 144 LBS | OXYGEN SATURATION: 97 % | SYSTOLIC BLOOD PRESSURE: 146 MMHG | DIASTOLIC BLOOD PRESSURE: 80 MMHG | HEIGHT: 67 IN | BODY MASS INDEX: 22.6 KG/M2

## 2023-06-15 DIAGNOSIS — M25.561 ACUTE PAIN OF RIGHT KNEE: Primary | ICD-10-CM

## 2023-06-15 PROCEDURE — 99213 OFFICE O/P EST LOW 20 MIN: CPT | Performed by: PHYSICIAN ASSISTANT

## 2023-06-15 NOTE — PROGRESS NOTES
"Chief Complaint  Leg Pain (Leg pain and swelling on right leg going on 2 months ago )    Subjective          Gloria Bruner presents to [facility]  History of Present Illness  Patient in today for evaluation on pain to right leg- she started a few months ago with swelling and pain to lower leg and had workup done and was negative for clot. She states pain to lower leg has improved and past 2 weeks she has been bothered by pain/swelling to right knee and noticed bruising to right upper thigh. Denies any injury to either area. Was noted on previous venous doppler possible dissecting popliteal cyst and possible hematoma.   Leg Pain   There was no injury mechanism.     Objective   Vital Signs:   /80 (BP Location: Left arm, Patient Position: Sitting, Cuff Size: Adult)   Pulse 74   Ht 170.2 cm (67\")   Wt 65.3 kg (144 lb)   SpO2 97%   BMI 22.55 kg/m²     Body mass index is 22.55 kg/m².    Review of Systems   Constitutional:  Negative for fatigue.   Respiratory:  Negative for chest tightness.    Cardiovascular:  Negative for chest pain.   Musculoskeletal:  Positive for arthralgias and joint swelling.   Neurological:  Negative for dizziness and headache.     Past History:  Medical History: has a past medical history of Diverticulitis.   Surgical History: has a past surgical history that includes COLON RESECTION WITH ILEOSTOMY (1974) and Hysterectomy (1976).   Family History: family history includes Stroke in her brother.   Social History: reports that she has never smoked. She has never used smokeless tobacco. She reports that she does not drink alcohol and does not use drugs.      Current Outpatient Medications:     estradiol (ESTRACE) 1 MG tablet, , Disp: , Rfl:     metoprolol tartrate (LOPRESSOR) 25 MG tablet, Take 1 tablet by mouth 2 (Two) Times a Day., Disp: 60 tablet, Rfl: 5  Allergies: Patient has no known allergies.    Physical Exam  Constitutional:       Appearance: Normal " appearance.   Cardiovascular:      Rate and Rhythm: Normal rate and regular rhythm.      Heart sounds: Normal heart sounds.   Pulmonary:      Breath sounds: Normal breath sounds.   Musculoskeletal:      Right knee: Normal. Swelling present. Tenderness present.      Comments: Tender to palpate right knee; swelling noted; no redness/heat noted; walks with nml gait; bruising noted to right thigh   Neurological:      Mental Status: She is alert and oriented to person, place, and time.   Psychiatric:         Mood and Affect: Mood normal.         Behavior: Behavior normal.           Assessment and Plan   Diagnoses and all orders for this visit:    1. Acute pain of right knee (Primary)  -     XR Knee 1 or 2 View Right (In Office); Future  -     Ambulatory Referral to Orthopedic Surgery  Patient to rtc for xray of knee and recommend to get in with orthopedist for further evaluation to area. TO ER prior for any worsening of symptoms if needed.           Follow Up   No follow-ups on file.  Patient was given instructions and counseling regarding her condition or for health maintenance advice. Please see specific information pulled into the AVS if appropriate.     Jacqueline Montemayor PA-C

## 2023-08-07 ENCOUNTER — OFFICE VISIT (OUTPATIENT)
Dept: FAMILY MEDICINE CLINIC | Facility: CLINIC | Age: 73
End: 2023-08-07
Payer: MEDICARE

## 2023-08-07 VITALS
WEIGHT: 140 LBS | BODY MASS INDEX: 21.97 KG/M2 | HEART RATE: 84 BPM | HEIGHT: 67 IN | DIASTOLIC BLOOD PRESSURE: 90 MMHG | SYSTOLIC BLOOD PRESSURE: 160 MMHG | OXYGEN SATURATION: 94 %

## 2023-08-07 DIAGNOSIS — M79.604 PAIN IN RIGHT LEG: ICD-10-CM

## 2023-08-07 DIAGNOSIS — R31.9 HEMATURIA, UNSPECIFIED TYPE: Primary | ICD-10-CM

## 2023-08-07 DIAGNOSIS — M25.461 EFFUSION, RIGHT KNEE: ICD-10-CM

## 2023-08-07 DIAGNOSIS — I10 ESSENTIAL HYPERTENSION: ICD-10-CM

## 2023-08-07 LAB
BILIRUB BLD-MCNC: NEGATIVE MG/DL
CLARITY, POC: CLEAR
COLOR UR: YELLOW
EXPIRATION DATE: ABNORMAL
GLUCOSE UR STRIP-MCNC: NEGATIVE MG/DL
KETONES UR QL: NEGATIVE
LEUKOCYTE EST, POC: NEGATIVE
Lab: ABNORMAL
NITRITE UR-MCNC: NEGATIVE MG/ML
PH UR: 5.5 [PH] (ref 5–8)
PROT UR STRIP-MCNC: ABNORMAL MG/DL
RBC # UR STRIP: ABNORMAL /UL
SP GR UR: 1.03 (ref 1–1.03)
UROBILINOGEN UR QL: NORMAL

## 2023-08-07 PROCEDURE — 3080F DIAST BP >= 90 MM HG: CPT | Performed by: FAMILY MEDICINE

## 2023-08-07 PROCEDURE — 3077F SYST BP >= 140 MM HG: CPT | Performed by: FAMILY MEDICINE

## 2023-08-07 PROCEDURE — 81003 URINALYSIS AUTO W/O SCOPE: CPT | Performed by: FAMILY MEDICINE

## 2023-08-07 PROCEDURE — 99214 OFFICE O/P EST MOD 30 MIN: CPT | Performed by: FAMILY MEDICINE

## 2023-08-07 RX ORDER — HYDROCODONE BITARTRATE AND ACETAMINOPHEN 5; 325 MG/1; MG/1
1 TABLET ORAL EVERY 6 HOURS PRN
Qty: 10 TABLET | Refills: 0 | Status: SHIPPED | OUTPATIENT
Start: 2023-08-07

## 2023-08-07 RX ORDER — MELOXICAM 7.5 MG/1
7.5 TABLET ORAL DAILY
Qty: 30 TABLET | Refills: 2 | Status: SHIPPED | OUTPATIENT
Start: 2023-08-07

## 2023-08-08 NOTE — PROGRESS NOTES
Follow Up Office Visit      Date of Visit:  2023   Patient Name: Gloria Bruner  : 1950   MRN: [id number]     Chief Complaint:    Chief Complaint   Patient presents with    Hypertension    Edema    Blood in Urine       History of Present Illness: Gloria Bruner is a 73 y.o. female who is here today for follow up.  Patient seen today for hematuria and some back pain.  She has a history of kidney stones.  She thinks she may have passed 1.  She has been straining her urine has and has had 1 small fragment of the stone.  She is continued to have an effusion with pain in her right leg.  She had a Baker's cyst that was found on ultrasound.  Patient also with hypertension that has not been well controlled.        Subjective      Review of Systems:   Review of Systems   Constitutional:  Negative for fatigue and fever.   HENT:  Negative for congestion and ear pain.    Respiratory:  Negative for apnea, cough, chest tightness and shortness of breath.    Cardiovascular:  Negative for chest pain.   Gastrointestinal:  Negative for abdominal pain, constipation, diarrhea and nausea.   Musculoskeletal:  Positive for arthralgias.   Psychiatric/Behavioral:  Negative for depressed mood and stress.      Past Medical History:   Past Medical History:   Diagnosis Date    Diverticulitis        Past Surgical History:   Past Surgical History:   Procedure Laterality Date    COLON RESECTION WITH ILEOSTOMY  1974    PERMANENT    HYSTERECTOMY      complete       Family History:   Family History   Problem Relation Age of Onset    Stroke Brother        Social History:   Social History     Socioeconomic History    Marital status:    Tobacco Use    Smoking status: Never    Smokeless tobacco: Never   Vaping Use    Vaping Use: Never used   Substance and Sexual Activity    Alcohol use: Never    Drug use: Never    Sexual activity: Defer       Medications:     Current Outpatient Medications:     estradiol  "(ESTRACE) 1 MG tablet, , Disp: , Rfl:     HYDROcodone-acetaminophen (NORCO) 5-325 MG per tablet, Take 1 tablet by mouth Every 6 (Six) Hours As Needed for Moderate Pain., Disp: 10 tablet, Rfl: 0    meloxicam (Mobic) 7.5 MG tablet, Take 1 tablet by mouth Daily., Disp: 30 tablet, Rfl: 2    valsartan (Diovan) 80 MG tablet, Take 1 tablet by mouth Daily., Disp: 30 tablet, Rfl: 0    Allergies:   No Known Allergies    Objective     Physical Exam:  Vital Signs:   Vitals:    08/07/23 1440   BP: 160/90   Pulse: 84   SpO2: 94%   Weight: 63.5 kg (140 lb)   Height: 170.2 cm (67\")     Body mass index is 21.93 kg/mý.     Physical Exam  Vitals and nursing note reviewed.   Constitutional:       General: She is not in acute distress.     Appearance: Normal appearance. She is not ill-appearing.   HENT:      Head: Normocephalic and atraumatic.      Right Ear: Tympanic membrane and ear canal normal.      Left Ear: Tympanic membrane and ear canal normal.      Nose: Nose normal.   Cardiovascular:      Rate and Rhythm: Normal rate and regular rhythm.      Heart sounds: Normal heart sounds.   Pulmonary:      Effort: Pulmonary effort is normal.      Breath sounds: Normal breath sounds.   Neurological:      Mental Status: She is alert and oriented to person, place, and time. Mental status is at baseline.   Psychiatric:         Mood and Affect: Mood normal.       Procedures      Assessment / Plan      Assessment/Plan:   Diagnoses and all orders for this visit:    1. Hematuria, unspecified type (Primary)  -     POCT urinalysis dipstick, automated  -     Cancel: Urine Culture - Urine, Urine, Clean Catch  -     Urine Culture - Urine, Urine, Clean Catch  -     XR Abdomen KUB (In Office)  -     HYDROcodone-acetaminophen (NORCO) 5-325 MG per tablet; Take 1 tablet by mouth Every 6 (Six) Hours As Needed for Moderate Pain.  Dispense: 10 tablet; Refill: 0    2. Effusion, right knee  -     meloxicam (Mobic) 7.5 MG tablet; Take 1 tablet by mouth Daily.  " Dispense: 30 tablet; Refill: 2    3. Pain in right leg  -     meloxicam (Mobic) 7.5 MG tablet; Take 1 tablet by mouth Daily.  Dispense: 30 tablet; Refill: 2    4. Essential hypertension         I suspect she had a kidney stone and that caused her hematuria.  We are culturing urine.  History of kidney stones.  Trial of meloxicam for her leg.  She is going go ahead and take her metoprolol twice a day for her hypertension.  We had already written that way but she had only been taking it once a day.    Follow Up:   No follow-ups on file.    Juan C Pérez  [facility] [place]

## 2023-08-09 LAB
BACTERIA UR CULT: NORMAL
BACTERIA UR CULT: NORMAL

## 2023-08-10 ENCOUNTER — TELEPHONE (OUTPATIENT)
Dept: FAMILY MEDICINE CLINIC | Facility: CLINIC | Age: 73
End: 2023-08-10
Payer: MEDICARE

## 2023-08-10 DIAGNOSIS — R31.9 HEMATURIA, UNSPECIFIED TYPE: Primary | ICD-10-CM

## 2023-08-10 NOTE — TELEPHONE ENCOUNTER
PT STOPPED BY OFFICE, URINE CULTURE RESULTS WERE GIVEN BY NIKKO, PT REPORTS SHE CONTINUES TO HAVE SOME PAIN AND DIFFICULTY WHEN URINATING. CAN SHE GET A REFERRAL TO UROLOGY? PLEASE CALL TO ADVISE.

## 2023-08-11 RX ORDER — VALSARTAN 80 MG/1
80 TABLET ORAL DAILY
Qty: 30 TABLET | Refills: 0 | Status: SHIPPED | OUTPATIENT
Start: 2023-08-11

## 2023-09-13 ENCOUNTER — CLINICAL SUPPORT (OUTPATIENT)
Dept: FAMILY MEDICINE CLINIC | Facility: CLINIC | Age: 73
End: 2023-09-13
Payer: MEDICARE

## 2023-09-13 DIAGNOSIS — R31.9 HEMATURIA, UNSPECIFIED TYPE: Primary | ICD-10-CM

## 2023-09-13 LAB
BILIRUB BLD-MCNC: NEGATIVE MG/DL
CLARITY, POC: CLEAR
COLOR UR: YELLOW
EXPIRATION DATE: ABNORMAL
GLUCOSE UR STRIP-MCNC: NEGATIVE MG/DL
KETONES UR QL: NEGATIVE
LEUKOCYTE EST, POC: NEGATIVE
Lab: ABNORMAL
NITRITE UR-MCNC: NEGATIVE MG/ML
PH UR: 5 [PH] (ref 5–8)
PROT UR STRIP-MCNC: ABNORMAL MG/DL
RBC # UR STRIP: ABNORMAL /UL
SP GR UR: 1.03 (ref 1–1.03)
UROBILINOGEN UR QL: NORMAL

## 2023-09-15 LAB
BACTERIA UR CULT: NORMAL
BACTERIA UR CULT: NORMAL

## 2023-09-20 ENCOUNTER — TELEPHONE (OUTPATIENT)
Dept: FAMILY MEDICINE CLINIC | Facility: CLINIC | Age: 73
End: 2023-09-20
Payer: MEDICARE

## 2023-09-20 DIAGNOSIS — I10 ESSENTIAL HYPERTENSION: Primary | ICD-10-CM

## 2023-09-20 NOTE — TELEPHONE ENCOUNTER
Patient said Beto put her back on her metoprolol and she is needing a refill sent to [facility] in [place]

## 2024-06-11 ENCOUNTER — TELEPHONE (OUTPATIENT)
Dept: FAMILY MEDICINE CLINIC | Facility: CLINIC | Age: 74
End: 2024-06-11
Payer: MEDICARE

## 2024-06-11 DIAGNOSIS — Z93.2 ILEOSTOMY IN PLACE: Primary | ICD-10-CM

## 2024-06-11 NOTE — TELEPHONE ENCOUNTER
Caller: Gloria Bruner    Relationship: Self    Best call back number: [phone]    Which medication are you concerned about:     metoprolol tartrate (LOPRESSOR) 25 MG tablet       Who prescribed you this medication: DR GATES    What are your concerns: AREA AROUND STOMA HAS AN ITCHY RASH, PATCH WON'T STICK TO SKIN WELL. IRRITATION CAUSES IT TO BLEED SOMETIMES.    THINKS HAS A YEAST INFECTION.  NEEDS REFERRAL TO RIGHT SIDE ILEOSTOMY DOCTOR/NURSE.    How long have you had these concern: SINCE STARTED THIS BP RX.    PLEASE CALL PATIENT TO DISCUSS.  THANK YOU

## 2024-06-12 RX ORDER — FLUCONAZOLE 150 MG/1
150 TABLET ORAL ONCE
Qty: 1 TABLET | Refills: 0 | Status: SHIPPED | OUTPATIENT
Start: 2024-06-12 | End: 2024-06-12

## 2024-06-12 RX ORDER — NYSTATIN 100000 U/G
1 CREAM TOPICAL 2 TIMES DAILY
Qty: 30 G | Refills: 2 | Status: SHIPPED | OUTPATIENT
Start: 2024-06-12

## 2024-06-12 NOTE — TELEPHONE ENCOUNTER
I sent in Diflucan which is a pill that would help with yeast.  She takes 1 pill 1 time and that is all.  I also sent in nystatin cream that she could use on the area if need be.  I will make a referral to an ostomy nurse.  There is really no way to this would have anything to do with the blood pressure medication that was started.

## 2024-06-14 NOTE — TELEPHONE ENCOUNTER
HUB TO RELAY    Call can't be completed. Unable to lvm.  I sent in Diflucan which is a pill that would help with yeast.  She takes 1 pill 1 time and that is all.  I also sent in nystatin cream that she could use on the area if need be.  I will make a referral to an ostomy nurse.  There is really no way to this would have anything to do with the blood pressure medication that was started.

## 2024-06-18 ENCOUNTER — OFFICE VISIT (OUTPATIENT)
Dept: FAMILY MEDICINE CLINIC | Facility: CLINIC | Age: 74
End: 2024-06-18
Payer: MEDICARE

## 2024-06-18 VITALS
SYSTOLIC BLOOD PRESSURE: 182 MMHG | HEART RATE: 73 BPM | DIASTOLIC BLOOD PRESSURE: 92 MMHG | OXYGEN SATURATION: 96 % | BODY MASS INDEX: 21.97 KG/M2 | WEIGHT: 140 LBS | HEIGHT: 67 IN

## 2024-06-18 DIAGNOSIS — Z93.2 ILEOSTOMY IN PLACE: ICD-10-CM

## 2024-06-18 DIAGNOSIS — F51.01 PRIMARY INSOMNIA: ICD-10-CM

## 2024-06-18 DIAGNOSIS — Z90.49 H/O COLECTOMY: ICD-10-CM

## 2024-06-18 DIAGNOSIS — D48.5 NEOPLASM OF UNCERTAIN BEHAVIOR OF SKIN: ICD-10-CM

## 2024-06-18 DIAGNOSIS — I10 BENIGN ESSENTIAL HTN: Primary | ICD-10-CM

## 2024-06-18 PROCEDURE — 1125F AMNT PAIN NOTED PAIN PRSNT: CPT | Performed by: FAMILY MEDICINE

## 2024-06-18 PROCEDURE — 99214 OFFICE O/P EST MOD 30 MIN: CPT | Performed by: FAMILY MEDICINE

## 2024-06-18 PROCEDURE — 3077F SYST BP >= 140 MM HG: CPT | Performed by: FAMILY MEDICINE

## 2024-06-18 PROCEDURE — 3080F DIAST BP >= 90 MM HG: CPT | Performed by: FAMILY MEDICINE

## 2024-06-18 RX ORDER — TRAZODONE HYDROCHLORIDE 50 MG/1
50 TABLET ORAL NIGHTLY
Qty: 30 TABLET | Refills: 2 | Status: SHIPPED | OUTPATIENT
Start: 2024-06-18

## 2024-06-18 RX ORDER — NEBIVOLOL 5 MG/1
5 TABLET ORAL DAILY
Qty: 30 TABLET | Refills: 2 | Status: SHIPPED | OUTPATIENT
Start: 2024-06-18

## 2024-06-18 NOTE — PROGRESS NOTES
Follow Up Office Visit      Date of Visit:  2024   Patient Name: Gloria Bruner  : 1950   MRN: [id number]     Chief Complaint:    Chief Complaint   Patient presents with    Rash    Hypertension    Insomnia       History of Present Illness: Gloria Bruner is a 74 y.o. female who is here today for follow up.    History of Present Illness  The patient is a 74-year-old female presenting with elevated blood pressure readings, sleep disturbances, and irritation around the stoma site subsequent to her previous abdominal surgery. She is accompanied by an adult male.    The patient, who has a history of colectomy and ileostomy, reports discomfort at the ileostomy site, particularly towards her navel and around the bottom. She consulted a nurse who suggested that her symptoms could be attributed to a variety of factors, weight gain, ileostomy medication-induced gastrointestinal upset.      The patient's blood pressure is not well-managed with medication. She initially took metoprolol twice daily, but has since reduced to once daily due to gastrointestinal upset and loss of appetite. She acknowledges the need to lose weight and believes that maintaining her weight could improve her blood pressure. She is currently experiencing a headache and can tell when her blood pressure is elevated. Her current medication regimen includes blood pressure medication and hormone medication.    The patient reports a wart on her neck and is seeking treatment for it.    The patient experiences insomnia, which she attributes to her ileostomy site leakage. She frequently wakes up during the night to check it, and her clothes become tangled in it.      Subjective      Review of Systems:   Review of Systems   Constitutional:  Negative for fatigue and fever.   HENT:  Negative for congestion and ear pain.    Respiratory:  Negative for apnea, cough, chest tightness and shortness of breath.    Cardiovascular:  Negative for chest pain.    Gastrointestinal:  Negative for abdominal pain, constipation, diarrhea and nausea.   Musculoskeletal:  Negative for arthralgias.   Skin:  Positive for rash.   Psychiatric/Behavioral:  Negative for depressed mood and stress.        Past Medical History:   Past Medical History:   Diagnosis Date    Diverticulitis        Past Surgical History:   Past Surgical History:   Procedure Laterality Date    COLON RESECTION WITH ILEOSTOMY  1974    PERMANENT    HYSTERECTOMY  1976    complete       Family History:   Family History   Problem Relation Age of Onset    Stroke Brother        Social History:   Social History     Socioeconomic History    Marital status:    Tobacco Use    Smoking status: Never    Smokeless tobacco: Never   Vaping Use    Vaping status: Never Used   Substance and Sexual Activity    Alcohol use: Never    Drug use: Never    Sexual activity: Defer       Medications:     Current Outpatient Medications:     estradiol (ESTRACE) 1 MG tablet, , Disp: , Rfl:     HYDROcodone-acetaminophen (NORCO) 5-325 MG per tablet, Take 1 tablet by mouth Every 6 (Six) Hours As Needed for Moderate Pain., Disp: 10 tablet, Rfl: 0    meloxicam (Mobic) 7.5 MG tablet, Take 1 tablet by mouth Daily., Disp: 30 tablet, Rfl: 2    metoprolol tartrate (LOPRESSOR) 25 MG tablet, Take 1 tablet by mouth 2 (Two) Times a Day., Disp: 60 tablet, Rfl: 2    nebivolol (Bystolic) 5 MG tablet, Take 1 tablet by mouth Daily., Disp: 30 tablet, Rfl: 2    nystatin (MYCOSTATIN) 642449 UNIT/GM cream, Apply 1 Application topically to the appropriate area as directed 2 (Two) Times a Day., Disp: 30 g, Rfl: 2    traZODone (DESYREL) 50 MG tablet, Take 1 tablet by mouth Every Night., Disp: 30 tablet, Rfl: 2    valsartan (DIOVAN) 80 MG tablet, TAKE 1 TABLET BY MOUTH DAILY, Disp: 30 tablet, Rfl: 0    Allergies:   No Known Allergies    Objective     Physical Exam:  Vital Signs:   Vitals:    06/18/24 1050   BP: (!) 182/92   Pulse: 73   SpO2: 96%   Weight: 63.5 kg  "(140 lb)   Height: 170.2 cm (67\")     Body mass index is 21.93 kg/m².     Physical Exam  Vitals and nursing note reviewed.   Constitutional:       General: She is not in acute distress.     Appearance: Normal appearance. She is not ill-appearing.   HENT:      Head: Normocephalic and atraumatic.      Right Ear: Tympanic membrane and ear canal normal.      Left Ear: Tympanic membrane and ear canal normal.      Nose: Nose normal.   Cardiovascular:      Rate and Rhythm: Normal rate and regular rhythm.      Heart sounds: Normal heart sounds.   Pulmonary:      Effort: Pulmonary effort is normal.      Breath sounds: Normal breath sounds.   Neurological:      Mental Status: She is alert and oriented to person, place, and time. Mental status is at baseline.   Psychiatric:         Mood and Affect: Mood normal.       Physical Exam  Vital Signs  The patient's blood pressure today is 182/92.    Cryotherapy, Skin Lesion    Date/Time: 6/18/2024 12:15 PM    Performed by: Juan C Pérez MD  Authorized by: Juan C Pérez MD  Patient tolerance: patient tolerated the procedure well with no immediate complications  Comments: 1 small lesion was frozen in the office.          Results    Assessment / Plan      Assessment/Plan:   Diagnoses and all orders for this visit:    1. Benign essential HTN (Primary)  -     nebivolol (Bystolic) 5 MG tablet; Take 1 tablet by mouth Daily.  Dispense: 30 tablet; Refill: 2    2. Ileostomy in place    3. H/O colectomy    4. Primary insomnia  -     traZODone (DESYREL) 50 MG tablet; Take 1 tablet by mouth Every Night.  Dispense: 30 tablet; Refill: 2    5. Neoplasm of uncertain behavior of skin       Assessment & Plan  1. Ileostomy.  A prescription for nystatin cream has been issued. The patient is advised to apply the cream to the stoma site, allow it to sit for a few minutes, and then reapply her ileostomy supplies. A referral will be made for the patient to consult with a stoma nurse.    2. " Hypertension.  The patient's blood pressure is not well-managed. The patient has been advised to discontinue metoprolol. A prescription for Bystolic has been issued, to be taken once daily.    3. Insomnia.  The patient's insomnia is likely due to her ileostomy issue. A prescription for trazodone has been issued, to be taken an hour before bedtime.    4. Neck wart.  The wart will be treated with liquid nitrogen in the office in a few weeks.        Follow Up:   No follow-ups on file.    Juan C Pérez  [facility] [place]     Patient or patient representative verbalized consent for the use of Ambient Listening during the visit with  Juan C Pérez MD for chart documentation. 6/18/2024  12:15 EDT

## 2024-06-24 ENCOUNTER — TELEPHONE (OUTPATIENT)
Dept: FAMILY MEDICINE CLINIC | Facility: CLINIC | Age: 74
End: 2024-06-24
Payer: MEDICARE

## 2024-06-24 DIAGNOSIS — Z90.49 H/O COLECTOMY: Primary | ICD-10-CM

## 2024-06-24 NOTE — TELEPHONE ENCOUNTER
Patient came into office stating she is still waiting on an update for an appointment with the ostomy nurse. Patient stated Dr. Pérez was going to try and set this up for her.   Patient also states that her BP medication is making her sick, can we send in an alternative medication for her?  Call back number: [phone]

## 2024-06-24 NOTE — TELEPHONE ENCOUNTER
PCP can make ambulatory referral to ostomy wound care at [facility]. I have pended referral

## 2024-06-25 ENCOUNTER — TELEPHONE (OUTPATIENT)
Dept: WOUND CARE | Facility: HOSPITAL | Age: 74
End: 2024-06-25
Payer: MEDICARE

## 2024-06-25 NOTE — TELEPHONE ENCOUNTER
Gloria Bruner  1950  [id number]    Summary: Received ambulatory referral through [facility] from patient's primary care physician Dr. Pérez.  Called patient to set up appointment for July 2 at 9:30 AM.  Spoke with patient's  and gave him directions to our outpatient area.  Patient states that she has had ileostomy for 49 years but has had irritation recently for which her doctor prescribed antifungal cream.  Patient states the antifungal cream causes the back to not stick.  Patient orders from [facility].  Patient encouraged to bring extra set of supplies on day of visit.    Patient states that she has liquid output at times but denied using Imodium.  Patient states she is very particular about ostomy care.    Time spent teleconferencing with patient was: 30 minutes

## 2024-07-02 ENCOUNTER — TELEPHONE (OUTPATIENT)
Dept: FAMILY MEDICINE CLINIC | Facility: CLINIC | Age: 74
End: 2024-07-02

## 2024-07-02 ENCOUNTER — HOSPITAL ENCOUNTER (OUTPATIENT)
Dept: WOUND CARE | Facility: HOSPITAL | Age: 74
Discharge: HOME | End: 2024-07-02
Admitting: FAMILY MEDICINE
Payer: MEDICARE

## 2024-07-02 PROCEDURE — G0463 HOSPITAL OUTPT CLINIC VISIT: HCPCS

## 2024-07-02 NOTE — NURSING NOTE
[facility] outpatient ostomy visit.    History and current problem: fungal rash around stoma off and on, PCP referred to [facility]. Occasional leaks    Surgery date: 49 years ago  Surgeon: unsure, 49 years ago after intestinal obstruction after childbirth    Patient arrives with plenty of supplies organized in suitcase.  at beside.     Stoma Type: ileostomy  Diameter/shape: 29mm  Location: right upper  Protrusion: yes  Mucosal condition and color: pink, moist, scattered granulomas (slight pain with paolation with q tip) between 3 and 7 oclock  Peristomal skin: slight erythema, not denuded, slight peeling epidermis  Supportive Tissue: creasing, saucering around stoma, quite a large dip towards 3 o clock (patient confirms leaks occur here)  Character of output: varies, very meticulous about fiber intake, hydration, timing of meals with changing appliances  Emptying frequency per day: 3-5    Current pouching system: Convatec ted-fit durahesive convexit 1 1/8, ya seal placed overlapping convexit barrier hole quite a bit, convatec bag no filter, 10 inch, clamp closure, stoma belt  Last appliance change: yesterday, usually aims for 3 day wear time, does not shower in between changes  Ostomy care: patient removed previous, cleansed, patient declined cauterizing granulomas, used VASHE wound solution and demonstrated how to perform 3-5 minute soak when symptoms are worse than today, stoma powder to granulomas (explained why), ya seal placed over hole by patient leaving only 22mm opening (RN molded to29mm and explained why to not cover barrier hole), barrier placed by nurse, bag and belt applied by patient    Demonstrated and explained when to use antifungal powder and introduced sealing in with skin barrier spray.     Goal wear time: 3 days    Recommendations:   Utilize VASHE wound solution (can be bought from [employer]) if irritation or rash develops. Use by placing VASHE soaked gauze or napkin against skin around stoma for 3-5  "minutes.  Continue Fabi ring-try to not cover barrier hole with ring-try and mold ring to precut barrier hole  If use antifungal powder-apply in thin layer, \"seal in\" with barrier spray (more can be purchased through [employer]- item #  [id number])  Consider ordering bags with built in filters for occasional gas buildup  Gave new [employer] catalog    Education provided:    Accessory products reviewed    I spent 60 minutes, face-to-face, caring for Gloria today.     Follow up appointment: No. Future appointment date and time: PRN, gave business card for future issues.     "

## 2024-07-02 NOTE — TELEPHONE ENCOUNTER
Caller: Gloria Bruner    Relationship: Self    Best call back number: [phone]    Which medication are you concerned about: PT CALLED STATED THAT RX    nebivolol (Bystolic) 5 MG tablet        AND traZODone (DESYREL) 50 MG tablet  UPSET HER STOMACH AND SHE HAS STOPPED TAKING RX'.  STATED THAT SHE WENT BACK TO RX  metoprolol tartrate (LOPRESSOR) 25 MG tablet

## 2024-07-03 ENCOUNTER — TELEPHONE (OUTPATIENT)
Dept: FAMILY MEDICINE CLINIC | Facility: CLINIC | Age: 74
End: 2024-07-03
Payer: MEDICARE

## 2024-07-03 NOTE — TELEPHONE ENCOUNTER
That is fine and the probably the metoprolol will have to be increased because it was not enough to control her blood pressure.  She probably at least needs to take 1-1/2 pills twice per day.  Have her try that and see how it works with her blood pressure.
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
## 2024-07-05 ENCOUNTER — TELEPHONE (OUTPATIENT)
Dept: FAMILY MEDICINE CLINIC | Facility: CLINIC | Age: 74
End: 2024-07-05
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
## 2024-07-09 ENCOUNTER — TELEPHONE (OUTPATIENT)
Dept: FAMILY MEDICINE CLINIC | Facility: CLINIC | Age: 74
End: 2024-07-09
Payer: MEDICARE

## 2024-07-09 NOTE — TELEPHONE ENCOUNTER
HUB TO RELAY    I will forward to dr aragon but he is on vacation. He will not be back in the office until 07/22. If she needs before then, she can schedule an appointment to see someone else to get it.

## 2024-07-09 NOTE — TELEPHONE ENCOUNTER
Hub to Relay message from Dr. Pérez     That is fine and the probably the metoprolol will have to be increased because it was not enough to control her blood pressure. She probably at least needs to take 1-1/2 pills twice per day. Have her try that and see how it works with her blood pressure.         No answer on the wound care she has asked for as he is out of the office. As soon as we hear from him we will reach out and let her know.

## 2024-07-09 NOTE — TELEPHONE ENCOUNTER
Attempted. No     Hub to Relay message from Dr. Pérez    That is fine and the probably the metoprolol will have to be increased because it was not enough to control her blood pressure. She probably at least needs to take 1-1/2 pills twice per day. Have her try that and see how it works with her blood pressure.       No answer on the wound care she has asked for as he is out of the office. As soon as we hear from him we will reach out and let her know.

## 2024-07-09 NOTE — TELEPHONE ENCOUNTER
Pt has called multiple times since 7/02 about wanting wound solution and question about bp meds. Please call pt and advise from all 3 encounters.

## 2024-07-13 NOTE — TELEPHONE ENCOUNTER
That wash does not come up in our system.   I'm not sure what it is?  Did she get in with an ostomy nurse?  See if you can talk to her and see if her pharmacy would have it.  Not sure what to order.

## 2024-07-13 NOTE — TELEPHONE ENCOUNTER
Called [facility] ostomy supply to give a verbal order for vashe wound solution for pt. [phone]  Closed during weekend. Will call Monday.

## 2024-07-13 NOTE — TELEPHONE ENCOUNTER
Called [facility] ostomy supply to give a verbal order for vashe wound solution for pt. [phone]  Closed during weekend. Will call Monday.

## 2024-07-15 NOTE — TELEPHONE ENCOUNTER
Called [facility]. They have already shipped out a order on the 5th of this month and if they need anything they will send us a fax. Pt pays out of pocket for this medication.

## 2024-08-13 ENCOUNTER — OFFICE VISIT (OUTPATIENT)
Dept: FAMILY MEDICINE CLINIC | Facility: CLINIC | Age: 74
End: 2024-08-13
Payer: MEDICARE

## 2024-08-13 VITALS
HEIGHT: 67 IN | DIASTOLIC BLOOD PRESSURE: 80 MMHG | OXYGEN SATURATION: 96 % | WEIGHT: 139 LBS | SYSTOLIC BLOOD PRESSURE: 140 MMHG | BODY MASS INDEX: 21.82 KG/M2 | HEART RATE: 65 BPM

## 2024-08-13 DIAGNOSIS — F51.01 PRIMARY INSOMNIA: ICD-10-CM

## 2024-08-13 DIAGNOSIS — I10 ESSENTIAL HYPERTENSION: ICD-10-CM

## 2024-08-13 PROCEDURE — 3079F DIAST BP 80-89 MM HG: CPT | Performed by: FAMILY MEDICINE

## 2024-08-13 PROCEDURE — 1125F AMNT PAIN NOTED PAIN PRSNT: CPT | Performed by: FAMILY MEDICINE

## 2024-08-13 PROCEDURE — 99214 OFFICE O/P EST MOD 30 MIN: CPT | Performed by: FAMILY MEDICINE

## 2024-08-13 PROCEDURE — 3077F SYST BP >= 140 MM HG: CPT | Performed by: FAMILY MEDICINE

## 2024-08-13 RX ORDER — TRAZODONE HYDROCHLORIDE 50 MG/1
TABLET ORAL
Qty: 180 TABLET | Refills: 1 | Status: SHIPPED | OUTPATIENT
Start: 2024-08-13

## 2024-08-13 NOTE — PROGRESS NOTES
Follow Up Office Visit      Date of Visit:  2024   Patient Name: Gloria rBuner  : 1950   MRN: [id number]     Chief Complaint:    Chief Complaint   Patient presents with    Hypertension       History of Present Illness: Gloria Bruner is a 74 y.o. female who is here today for follow up.    History of Present Illness  The patient presents for evaluation of multiple medical concerns. He is accompanied by her .    She has lost weight, dropping from 145 pounds to 139 pounds, and his blood pressure is well-managed. Se does require blood pressure medication and finds trazodone helpful for sleep, which she takes at night.           Subjective      Review of Systems:   Review of Systems   Constitutional:  Negative for fatigue and fever.   HENT:  Negative for congestion and ear pain.    Respiratory:  Negative for apnea, cough, chest tightness and shortness of breath.    Cardiovascular:  Negative for chest pain.   Gastrointestinal:  Negative for abdominal pain, constipation, diarrhea and nausea.   Psychiatric/Behavioral:  Negative for depressed mood and stress.        Past Medical History:   Past Medical History:   Diagnosis Date    Diverticulitis        Past Surgical History:   Past Surgical History:   Procedure Laterality Date    COLON RESECTION WITH ILEOSTOMY  1974    PERMANENT    HYSTERECTOMY      complete       Family History:   Family History   Problem Relation Age of Onset    Stroke Brother        Social History:   Social History     Socioeconomic History    Marital status:    Tobacco Use    Smoking status: Never    Smokeless tobacco: Never   Vaping Use    Vaping status: Never Used   Substance and Sexual Activity    Alcohol use: Never    Drug use: Never    Sexual activity: Defer       Medications:     Current Outpatient Medications:     metoprolol tartrate (LOPRESSOR) 25 MG tablet, Take 0.5 tablets by mouth 2 (Two) Times a Day., Disp: 90 tablet, Rfl: 1    traZODone (DESYREL) 50 MG  "tablet, 1-2 po qhs, Disp: 180 tablet, Rfl: 1    estradiol (ESTRACE) 1 MG tablet, , Disp: , Rfl:     Allergies:   No Known Allergies    Objective     Physical Exam:  Vital Signs:   Vitals:    08/13/24 1124   BP: 140/80   Pulse: 65   SpO2: 96%   Weight: 63 kg (139 lb)   Height: 170.2 cm (67\")     Body mass index is 21.77 kg/m².     Physical Exam  Vitals and nursing note reviewed.   Constitutional:       General: She is not in acute distress.     Appearance: Normal appearance. She is not ill-appearing.   HENT:      Head: Normocephalic and atraumatic.      Right Ear: Tympanic membrane and ear canal normal.      Left Ear: Tympanic membrane and ear canal normal.      Nose: Nose normal.   Cardiovascular:      Rate and Rhythm: Normal rate and regular rhythm.      Heart sounds: Normal heart sounds.   Pulmonary:      Effort: Pulmonary effort is normal.      Breath sounds: Normal breath sounds.   Neurological:      Mental Status: She is alert and oriented to person, place, and time. Mental status is at baseline.   Psychiatric:         Mood and Affect: Mood normal.       Physical Exam  Vital Signs  Patient's weight is 139.    Procedures    Results  Laboratory Studies  Blood count dropped from 12 to 10.  Assessment / Plan      Assessment/Plan:   Diagnoses and all orders for this visit:    1. Primary insomnia  -     traZODone (DESYREL) 50 MG tablet; 1-2 po qhs  Dispense: 180 tablet; Refill: 1    2. Essential hypertension  -     metoprolol tartrate (LOPRESSOR) 25 MG tablet; Take 0.5 tablets by mouth 2 (Two) Times a Day.  Dispense: 90 tablet; Refill: 1  -     CBC & Differential; Future  -     Comprehensive Metabolic Panel; Future  -     Lipid Panel; Future  -     TSH; Future       Assessment & Plan  Ordered lab work for her hypertension.  Continue metoprolol at 1/2 pill 25 mg twice daily.  Continue trazodone for insomnia.        Follow Up:   No follow-ups on file.    Juan C Pérez  [facility] [place]     Patient or " patient representative verbalized consent for the use of Ambient Listening during the visit with  Juan C Pérez MD for chart documentation. 8/13/2024  12:53 EDT

## 2024-09-26 ENCOUNTER — OFFICE VISIT (OUTPATIENT)
Dept: FAMILY MEDICINE CLINIC | Facility: CLINIC | Age: 74
End: 2024-09-26
Payer: MEDICARE

## 2024-09-26 VITALS
SYSTOLIC BLOOD PRESSURE: 158 MMHG | OXYGEN SATURATION: 94 % | HEIGHT: 67 IN | HEART RATE: 93 BPM | DIASTOLIC BLOOD PRESSURE: 90 MMHG | BODY MASS INDEX: 21.5 KG/M2 | WEIGHT: 137 LBS

## 2024-09-26 DIAGNOSIS — Z87.442 HISTORY OF KIDNEY STONES: ICD-10-CM

## 2024-09-26 DIAGNOSIS — R82.90 UNSPECIFIED ABNORMAL FINDINGS IN URINE: ICD-10-CM

## 2024-09-26 DIAGNOSIS — M54.50 ACUTE LEFT-SIDED LOW BACK PAIN, UNSPECIFIED WHETHER SCIATICA PRESENT: Primary | ICD-10-CM

## 2024-09-26 LAB
BILIRUB BLD-MCNC: NEGATIVE MG/DL
CLARITY, POC: CLEAR
COLOR UR: YELLOW
EXPIRATION DATE: ABNORMAL
GLUCOSE UR STRIP-MCNC: NEGATIVE MG/DL
KETONES UR QL: NEGATIVE
LEUKOCYTE EST, POC: NEGATIVE
Lab: ABNORMAL
NITRITE UR-MCNC: NEGATIVE MG/ML
PH UR: 5.5 [PH] (ref 5–8)
PROT UR STRIP-MCNC: ABNORMAL MG/DL
RBC # UR STRIP: ABNORMAL /UL
SP GR UR: 1.03 (ref 1–1.03)
UROBILINOGEN UR QL: ABNORMAL

## 2024-09-26 PROCEDURE — 99214 OFFICE O/P EST MOD 30 MIN: CPT | Performed by: PHYSICIAN ASSISTANT

## 2024-09-26 PROCEDURE — 3077F SYST BP >= 140 MM HG: CPT | Performed by: PHYSICIAN ASSISTANT

## 2024-09-26 PROCEDURE — 81003 URINALYSIS AUTO W/O SCOPE: CPT | Performed by: PHYSICIAN ASSISTANT

## 2024-09-26 PROCEDURE — 1125F AMNT PAIN NOTED PAIN PRSNT: CPT | Performed by: PHYSICIAN ASSISTANT

## 2024-09-26 PROCEDURE — 3080F DIAST BP >= 90 MM HG: CPT | Performed by: PHYSICIAN ASSISTANT

## 2024-09-28 LAB
BACTERIA UR CULT: NORMAL
BACTERIA UR CULT: NORMAL

## 2024-12-02 ENCOUNTER — OFFICE VISIT (OUTPATIENT)
Dept: FAMILY MEDICINE CLINIC | Facility: CLINIC | Age: 74
End: 2024-12-02
Payer: MEDICARE

## 2024-12-02 VITALS
HEART RATE: 91 BPM | BODY MASS INDEX: 21.39 KG/M2 | DIASTOLIC BLOOD PRESSURE: 90 MMHG | HEIGHT: 67 IN | SYSTOLIC BLOOD PRESSURE: 142 MMHG | OXYGEN SATURATION: 98 % | WEIGHT: 136.3 LBS

## 2024-12-02 DIAGNOSIS — R05.1 ACUTE COUGH: ICD-10-CM

## 2024-12-02 DIAGNOSIS — J40 BRONCHITIS: Primary | ICD-10-CM

## 2024-12-02 LAB
EXPIRATION DATE: NORMAL
FLUAV AG UPPER RESP QL IA.RAPID: NOT DETECTED
FLUBV AG UPPER RESP QL IA.RAPID: NOT DETECTED
INTERNAL CONTROL: NORMAL
Lab: NORMAL
SARS-COV-2 AG UPPER RESP QL IA.RAPID: NOT DETECTED

## 2024-12-02 PROCEDURE — 3077F SYST BP >= 140 MM HG: CPT | Performed by: NURSE PRACTITIONER

## 2024-12-02 PROCEDURE — 1125F AMNT PAIN NOTED PAIN PRSNT: CPT | Performed by: NURSE PRACTITIONER

## 2024-12-02 PROCEDURE — 1159F MED LIST DOCD IN RCRD: CPT | Performed by: NURSE PRACTITIONER

## 2024-12-02 PROCEDURE — 1160F RVW MEDS BY RX/DR IN RCRD: CPT | Performed by: NURSE PRACTITIONER

## 2024-12-02 PROCEDURE — 3080F DIAST BP >= 90 MM HG: CPT | Performed by: NURSE PRACTITIONER

## 2024-12-02 PROCEDURE — 99214 OFFICE O/P EST MOD 30 MIN: CPT | Performed by: NURSE PRACTITIONER

## 2024-12-02 PROCEDURE — 87428 SARSCOV & INF VIR A&B AG IA: CPT | Performed by: NURSE PRACTITIONER

## 2024-12-02 RX ORDER — AZITHROMYCIN 200 MG/5ML
POWDER, FOR SUSPENSION ORAL
Qty: 38 ML | Refills: 0 | Status: SHIPPED | OUTPATIENT
Start: 2024-12-02

## 2024-12-02 RX ORDER — PREDNISONE 20 MG/1
20 TABLET ORAL DAILY
Qty: 10 TABLET | Refills: 0 | Status: SHIPPED | OUTPATIENT
Start: 2024-12-02

## 2024-12-02 RX ORDER — DEXTROMETHORPHAN HYDROBROMIDE AND PROMETHAZINE HYDROCHLORIDE 15; 6.25 MG/5ML; MG/5ML
5 SYRUP ORAL 4 TIMES DAILY PRN
Qty: 120 ML | Refills: 0 | Status: SHIPPED | OUTPATIENT
Start: 2024-12-02

## 2024-12-02 NOTE — PROGRESS NOTES
"Chief Complaint  Earache (Patient presents today for bilateral ear pain and cough started before thanksgiving. Has no appetite. Not coughing up anything. Also has a headache.)    Subjective          Gloria Bruner presents to [facility]  History of Present Illness  Pt has had cough, congestion, body aches, headache, and earache since Thursday. She denies dyspnea, N/V/D.   Earache   Associated symptoms include coughing. Pertinent negatives include no diarrhea or vomiting.       History of Present Illness      Objective   Vital Signs:   /90   Pulse 91   Ht 170.2 cm (67\")   Wt 61.8 kg (136 lb 4.8 oz)   SpO2 98%   BMI 21.35 kg/m²     Body mass index is 21.35 kg/m².    Review of Systems   Constitutional:  Positive for chills. Negative for fatigue and fever.   HENT:  Positive for congestion and ear pain.    Respiratory:  Positive for cough. Negative for shortness of breath.    Cardiovascular:  Negative for chest pain, palpitations and leg swelling.   Gastrointestinal:  Negative for diarrhea, nausea and vomiting.   Neurological:  Negative for syncope.   Psychiatric/Behavioral:  The patient is not nervous/anxious.           Current Outpatient Medications:     estradiol (ESTRACE) 1 MG tablet, , Disp: , Rfl:     metoprolol tartrate (LOPRESSOR) 25 MG tablet, Take 0.5 tablets by mouth 2 (Two) Times a Day., Disp: 90 tablet, Rfl: 1    traZODone (DESYREL) 50 MG tablet, 1-2 po qhs, Disp: 180 tablet, Rfl: 1    azithromycin (Zithromax) 200 MG/5ML suspension, Give the patient 500 mg by mouth the first day then 250 mg by mouth daily for 4 days., Disp: 38 mL, Rfl: 0    promethazine-dextromethorphan (PROMETHAZINE-DM) 6.25-15 MG/5ML syrup, Take 5 mL by mouth 4 (Four) Times a Day As Needed for Cough., Disp: 120 mL, Rfl: 0      Allergies: Patient has no known allergies.    Physical Exam  Constitutional:       Appearance: Normal appearance.   HENT:      Head: Normocephalic.   Eyes:      " Conjunctiva/sclera: Conjunctivae normal.      Pupils: Pupils are equal, round, and reactive to light.   Cardiovascular:      Rate and Rhythm: Normal rate and regular rhythm.      Heart sounds: Normal heart sounds.   Pulmonary:      Effort: Pulmonary effort is normal.      Breath sounds: Normal breath sounds.   Abdominal:      Tenderness: There is no abdominal tenderness.   Musculoskeletal:         General: Normal range of motion.   Skin:     General: Skin is warm and dry.      Capillary Refill: Capillary refill takes less than 2 seconds.   Neurological:      General: No focal deficit present.      Mental Status: She is alert and oriented to person, place, and time.   Psychiatric:         Mood and Affect: Mood normal.         Behavior: Behavior normal.         Thought Content: Thought content normal.         Judgment: Judgment normal.          Physical Exam         Result Review :                Results            Assessment and Plan    Diagnoses and all orders for this visit:    1. Bronchitis (Primary)  Comments:  Increase fluids. Mucinex and Phen DM PRN. Finish antibiotics. RTC for worsened sx and if not improving in 1 week.  Orders:  -     promethazine-dextromethorphan (PROMETHAZINE-DM) 6.25-15 MG/5ML syrup; Take 5 mL by mouth 4 (Four) Times a Day As Needed for Cough.  Dispense: 120 mL; Refill: 0  -     azithromycin (Zithromax) 200 MG/5ML suspension; Give the patient 500 mg by mouth the first day then 250 mg by mouth daily for 4 days.  Dispense: 38 mL; Refill: 0    2. Acute cough  -     POCT SARS-CoV-2 Antigen SCOTT + Flu                  Follow Up   Return in about 1 week (around 12/9/2024) for if not improving or sooner if symptoms worsen.  Patient was given instructions and counseling regarding her condition or for health maintenance advice. Please see specific information pulled into the AVS if appropriate.     JE Sanchez

## 2025-02-09 DIAGNOSIS — F51.01 PRIMARY INSOMNIA: ICD-10-CM

## 2025-02-10 ENCOUNTER — TELEPHONE (OUTPATIENT)
Dept: FAMILY MEDICINE CLINIC | Facility: CLINIC | Age: 75
End: 2025-02-10
Payer: MEDICARE

## 2025-02-10 RX ORDER — TRAZODONE HYDROCHLORIDE 50 MG/1
TABLET, FILM COATED ORAL
Qty: 60 TABLET | Refills: 1 | Status: SHIPPED | OUTPATIENT
Start: 2025-02-10

## 2025-02-11 NOTE — TELEPHONE ENCOUNTER
Patient did not answer    HUB RELAY    PATIENT NEEDS AN OFFICE VISIT FOR A MED RECHECK WITH DR. GATES

## 2025-02-11 NOTE — TELEPHONE ENCOUNTER
Name: Gloria Bruner    Relationship: Self    Best Callback Number: [phone]    HUB PROVIDED THE RELAY MESSAGE FROM THE OFFICE   PATIENT VOICED UNDERSTANDING AND HAS NO FURTHER QUESTIONS AT THIS TIME    ADDITIONAL INFORMATION:

## 2025-03-11 ENCOUNTER — OFFICE VISIT (OUTPATIENT)
Dept: FAMILY MEDICINE CLINIC | Facility: CLINIC | Age: 75
End: 2025-03-11
Payer: MEDICARE

## 2025-03-11 VITALS
WEIGHT: 139.5 LBS | OXYGEN SATURATION: 98 % | DIASTOLIC BLOOD PRESSURE: 90 MMHG | HEIGHT: 67 IN | SYSTOLIC BLOOD PRESSURE: 150 MMHG | HEART RATE: 86 BPM | BODY MASS INDEX: 21.9 KG/M2

## 2025-03-11 DIAGNOSIS — F51.01 PRIMARY INSOMNIA: ICD-10-CM

## 2025-03-11 DIAGNOSIS — I10 ESSENTIAL HYPERTENSION: Primary | ICD-10-CM

## 2025-03-11 PROCEDURE — 3077F SYST BP >= 140 MM HG: CPT | Performed by: FAMILY MEDICINE

## 2025-03-11 PROCEDURE — 3080F DIAST BP >= 90 MM HG: CPT | Performed by: FAMILY MEDICINE

## 2025-03-11 PROCEDURE — 1125F AMNT PAIN NOTED PAIN PRSNT: CPT | Performed by: FAMILY MEDICINE

## 2025-03-11 PROCEDURE — 99213 OFFICE O/P EST LOW 20 MIN: CPT | Performed by: FAMILY MEDICINE

## 2025-03-11 RX ORDER — TRAZODONE HYDROCHLORIDE 50 MG/1
TABLET ORAL
Qty: 60 TABLET | Refills: 5 | Status: SHIPPED | OUTPATIENT
Start: 2025-03-11

## 2025-03-12 LAB
ALBUMIN SERPL-MCNC: 4.6 G/DL (ref 3.8–4.8)
ALP SERPL-CCNC: 86 IU/L (ref 44–121)
ALT SERPL-CCNC: 14 IU/L (ref 0–32)
AST SERPL-CCNC: 19 IU/L (ref 0–40)
BASOPHILS # BLD AUTO: 0 X10E3/UL (ref 0–0.2)
BASOPHILS NFR BLD AUTO: 1 %
BILIRUB SERPL-MCNC: 0.4 MG/DL (ref 0–1.2)
BUN SERPL-MCNC: 14 MG/DL (ref 8–27)
BUN/CREAT SERPL: 14 (ref 12–28)
CALCIUM SERPL-MCNC: 9.6 MG/DL (ref 8.7–10.3)
CHLORIDE SERPL-SCNC: 104 MMOL/L (ref 96–106)
CHOLEST SERPL-MCNC: 228 MG/DL (ref 100–199)
CO2 SERPL-SCNC: 20 MMOL/L (ref 20–29)
CREAT SERPL-MCNC: 1.03 MG/DL (ref 0.57–1)
EGFRCR SERPLBLD CKD-EPI 2021: 58 ML/MIN/1.73
EOSINOPHIL # BLD AUTO: 0 X10E3/UL (ref 0–0.4)
EOSINOPHIL NFR BLD AUTO: 1 %
ERYTHROCYTE [DISTWIDTH] IN BLOOD BY AUTOMATED COUNT: 14.1 % (ref 11.7–15.4)
GLOBULIN SER CALC-MCNC: 2.6 G/DL (ref 1.5–4.5)
GLUCOSE SERPL-MCNC: 103 MG/DL (ref 70–99)
HCT VFR BLD AUTO: 43.1 % (ref 34–46.6)
HDLC SERPL-MCNC: 51 MG/DL
HGB BLD-MCNC: 13.9 G/DL (ref 11.1–15.9)
IMM GRANULOCYTES # BLD AUTO: 0 X10E3/UL (ref 0–0.1)
IMM GRANULOCYTES NFR BLD AUTO: 0 %
LDLC SERPL CALC-MCNC: 148 MG/DL (ref 0–99)
LYMPHOCYTES # BLD AUTO: 1.2 X10E3/UL (ref 0.7–3.1)
LYMPHOCYTES NFR BLD AUTO: 19 %
MCH RBC QN AUTO: 29.3 PG (ref 26.6–33)
MCHC RBC AUTO-ENTMCNC: 32.3 G/DL (ref 31.5–35.7)
MCV RBC AUTO: 91 FL (ref 79–97)
MONOCYTES # BLD AUTO: 0.5 X10E3/UL (ref 0.1–0.9)
MONOCYTES NFR BLD AUTO: 8 %
NEUTROPHILS # BLD AUTO: 4.8 X10E3/UL (ref 1.4–7)
NEUTROPHILS NFR BLD AUTO: 71 %
PLATELET # BLD AUTO: 167 X10E3/UL (ref 150–450)
POTASSIUM SERPL-SCNC: 4.5 MMOL/L (ref 3.5–5.2)
PROT SERPL-MCNC: 7.2 G/DL (ref 6–8.5)
RBC # BLD AUTO: 4.75 X10E6/UL (ref 3.77–5.28)
SODIUM SERPL-SCNC: 142 MMOL/L (ref 134–144)
TRIGL SERPL-MCNC: 162 MG/DL (ref 0–149)
TSH SERPL DL<=0.005 MIU/L-ACNC: 0.82 UIU/ML (ref 0.45–4.5)
VLDLC SERPL CALC-MCNC: 29 MG/DL (ref 5–40)
WBC # BLD AUTO: 6.6 X10E3/UL (ref 3.4–10.8)

## 2025-03-12 NOTE — PROGRESS NOTES
Follow Up Office Visit      Date of Visit:  2025   Patient Name: Gloria Bruner  : 1950   MRN: [id number]     Chief Complaint:    Chief Complaint   Patient presents with    Hypertension       History of Present Illness: Gloria Bruner is a 74 y.o. female who is here today for follow up.    History of Present Illness  The patient presents for evaluation of blood pressure management, sleep issues, and overactive bladder.    She reports elevated blood pressure, which she attributes to not taking her antihypertensive medication today. She typically administers this medication in the afternoon and believes the dosage should be 2 tablets. However, she has been consistently taking only 1 tablet daily. She expresses a general aversion to medication, preferring to take her sleep aid at night and a hormone pill intermittently. She also reports experiencing gastrointestinal discomfort when taking multiple medications. She usually tries to eat something when she takes her blood pressure medication. She has sufficient supply of her antihypertensive medication and hormone pill but requires a refill of her sleep aid.    She reports frequent urination but has no history of kidney stones. She maintains adequate hydration and does not require medication for overactive bladder.    MEDICATIONS  Current: trazodone      Subjective      Review of Systems:   Review of Systems    Past Medical History:   Past Medical History:   Diagnosis Date    Diverticulitis     Hypertension        Past Surgical History:   Past Surgical History:   Procedure Laterality Date    COLON RESECTION WITH ILEOSTOMY  1974    PERMANENT    HYSTERECTOMY      complete       Family History:   Family History   Problem Relation Age of Onset    Stroke Brother        Social History:   Social History     Socioeconomic History    Marital status:    Tobacco Use    Smoking status: Never    Smokeless tobacco: Never   Vaping Use    Vaping status: Never  "Used   Substance and Sexual Activity    Alcohol use: Never    Drug use: Never    Sexual activity: Defer       Medications:     Current Outpatient Medications:     traZODone (DESYREL) 50 MG tablet, TAKE ONE TO TWO TABLETS BY MOUTH EVERY NIGHT AT BEDTIME, Disp: 60 tablet, Rfl: 5    azithromycin (Zithromax) 200 MG/5ML suspension, Give the patient 500 mg by mouth the first day then 250 mg by mouth daily for 4 days., Disp: 38 mL, Rfl: 0    estradiol (ESTRACE) 1 MG tablet, , Disp: , Rfl:     metoprolol tartrate (LOPRESSOR) 25 MG tablet, Take 0.5 tablets by mouth 2 (Two) Times a Day., Disp: 90 tablet, Rfl: 1    predniSONE (DELTASONE) 20 MG tablet, Take 1 tablet by mouth Daily., Disp: 10 tablet, Rfl: 0    promethazine-dextromethorphan (PROMETHAZINE-DM) 6.25-15 MG/5ML syrup, Take 5 mL by mouth 4 (Four) Times a Day As Needed for Cough., Disp: 120 mL, Rfl: 0    Allergies:   No Known Allergies    Objective     Physical Exam:  Vital Signs:   Vitals:    03/11/25 1417   BP: 150/90   Pulse: 86   SpO2: 98%   Weight: 63.3 kg (139 lb 8 oz)   Height: 170.2 cm (67\")     Body mass index is 21.85 kg/m².     Physical Exam  Vitals and nursing note reviewed.   Constitutional:       General: She is not in acute distress.     Appearance: Normal appearance. She is not ill-appearing.   HENT:      Head: Normocephalic and atraumatic.      Right Ear: Tympanic membrane and ear canal normal.      Left Ear: Tympanic membrane and ear canal normal.      Nose: Nose normal.   Cardiovascular:      Rate and Rhythm: Normal rate and regular rhythm.      Heart sounds: Normal heart sounds.   Pulmonary:      Effort: Pulmonary effort is normal.      Breath sounds: Normal breath sounds.   Neurological:      Mental Status: She is alert and oriented to person, place, and time. Mental status is at baseline.   Psychiatric:         Mood and Affect: Mood normal.       Physical Exam  Vital Signs  Blood pressure is 150/90.    Procedures    Results    Assessment / Plan  "     Assessment/Plan:   Diagnoses and all orders for this visit:    1. Essential hypertension (Primary)  -     CBC & Differential  -     Comprehensive Metabolic Panel  -     Lipid Panel  -     TSH    2. Primary insomnia  -     traZODone (DESYREL) 50 MG tablet; TAKE ONE TO TWO TABLETS BY MOUTH EVERY NIGHT AT BEDTIME  Dispense: 60 tablet; Refill: 5       Assessment & Plan  1. Blood pressure management.  Her blood pressure readings have been consistently elevated, with today's measurement at 150/90. She has experienced adverse reactions to certain antihypertensive medications, making it challenging to find suitable alternatives. The current medication appears to be well-tolerated. She is advised to split her current antihypertensive medication into 2 doses, one in the morning and one in the evening, to enhance tolerance. A prescription for her sleep aid has been provided, with the option to increase the dosage to 2 tablets if necessary. Blood work has been ordered to monitor her response to the medications.            Follow Up:   Return in about 6 months (around 9/11/2025) for Medicare Wellness.    Juan C Pérez  [facility]     Patient or patient representative verbalized consent for the use of Ambient Listening during the visit with  Juan C Pérez MD for chart documentation. 3/11/2025  20:46 EDT

## 2025-04-01 DIAGNOSIS — L84 CALLUS OF FOOT: Primary | ICD-10-CM

## 2025-04-09 DIAGNOSIS — F51.01 PRIMARY INSOMNIA: ICD-10-CM

## 2025-04-11 RX ORDER — TRAZODONE HYDROCHLORIDE 50 MG/1
50-100 TABLET ORAL
Qty: 60 TABLET | Refills: 5 | OUTPATIENT
Start: 2025-04-11

## 2025-05-05 DIAGNOSIS — I10 ESSENTIAL HYPERTENSION: ICD-10-CM

## 2025-05-06 RX ORDER — METOPROLOL TARTRATE 25 MG/1
12.5 TABLET, FILM COATED ORAL 2 TIMES DAILY
Qty: 90 TABLET | Refills: 1 | Status: SHIPPED | OUTPATIENT
Start: 2025-05-06

## 2025-05-07 DIAGNOSIS — I10 ESSENTIAL HYPERTENSION: ICD-10-CM

## 2025-05-07 RX ORDER — METOPROLOL TARTRATE 25 MG/1
12.5 TABLET, FILM COATED ORAL 2 TIMES DAILY
Qty: 90 TABLET | Refills: 1 | OUTPATIENT
Start: 2025-05-07